# Patient Record
Sex: MALE | Race: BLACK OR AFRICAN AMERICAN | NOT HISPANIC OR LATINO | Employment: UNEMPLOYED | URBAN - METROPOLITAN AREA
[De-identification: names, ages, dates, MRNs, and addresses within clinical notes are randomized per-mention and may not be internally consistent; named-entity substitution may affect disease eponyms.]

---

## 2019-08-20 ENCOUNTER — HOSPITAL ENCOUNTER (OUTPATIENT)
Dept: MEDSURG UNIT | Facility: HOSPITAL | Age: 59
End: 2019-08-21
Attending: INTERNAL MEDICINE | Admitting: INTERNAL MEDICINE

## 2019-08-20 LAB
ABS NEUT (OLG): 3.41 X10(3)/MCL (ref 2.1–9.2)
ALBUMIN SERPL-MCNC: 3.5 GM/DL (ref 3.4–5)
ALBUMIN/GLOB SERPL: 0.9 RATIO (ref 1.1–2)
ALP SERPL-CCNC: 75 UNIT/L (ref 45–117)
ALT SERPL-CCNC: 42 UNIT/L (ref 12–78)
AMPHET UR QL SCN: NEGATIVE
APPEARANCE, UA: CLEAR
APTT PPP: 26.9 SECOND(S) (ref 23.3–37)
AST SERPL-CCNC: 28 UNIT/L (ref 15–37)
BACTERIA #/AREA URNS AUTO: ABNORMAL /[HPF]
BARBITURATE SCN PRESENT UR: NEGATIVE
BASOPHILS # BLD AUTO: 0.03 X10(3)/MCL
BASOPHILS NFR BLD AUTO: 0 %
BENZODIAZ UR QL SCN: NEGATIVE
BILIRUB SERPL-MCNC: 0.3 MG/DL (ref 0.2–1)
BILIRUB UR QL STRIP: NEGATIVE
BILIRUBIN DIRECT+TOT PNL SERPL-MCNC: 0.1 MG/DL
BILIRUBIN DIRECT+TOT PNL SERPL-MCNC: 0.2 MG/DL
BUN SERPL-MCNC: 27 MG/DL (ref 7–18)
CALCIUM SERPL-MCNC: 8.5 MG/DL (ref 8.5–10.1)
CANNABINOIDS UR QL SCN: NEGATIVE
CHLORIDE SERPL-SCNC: 98 MMOL/L (ref 98–107)
CK MB SERPL-MCNC: 2.1 NG/ML (ref 1–3.6)
CK SERPL-CCNC: 373 UNIT/L (ref 39–308)
CO2 SERPL-SCNC: 28 MMOL/L (ref 21–32)
COCAINE UR QL SCN: NEGATIVE
COLOR UR: NORMAL
CREAT SERPL-MCNC: 1.4 MG/DL (ref 0.6–1.3)
EOSINOPHIL # BLD AUTO: 0.14 X10(3)/MCL
EOSINOPHIL NFR BLD AUTO: 2 %
ERYTHROCYTE [DISTWIDTH] IN BLOOD BY AUTOMATED COUNT: 13.3 % (ref 11.5–14.5)
ETHANOL SERPL-MCNC: 102 MG/DL
GLOBULIN SER-MCNC: 3.7 GM/ML (ref 2.3–3.5)
GLUCOSE (UA): NORMAL
GLUCOSE SERPL-MCNC: 105 MG/DL (ref 74–106)
HCT VFR BLD AUTO: 49.2 % (ref 40–51)
HGB BLD-MCNC: 16 GM/DL (ref 13.5–17.5)
HGB UR QL STRIP: NEGATIVE
HYALINE CASTS #/AREA URNS LPF: ABNORMAL /[LPF]
IMM GRANULOCYTES # BLD AUTO: 0.01 10*3/UL
IMM GRANULOCYTES NFR BLD AUTO: 0 %
INR PPP: 1.02 (ref 0.9–1.2)
KETONES UR QL STRIP: NEGATIVE
LEUKOCYTE ESTERASE UR QL STRIP: NEGATIVE
LYMPHOCYTES # BLD AUTO: 1.73 X10(3)/MCL
LYMPHOCYTES NFR BLD AUTO: 29 % (ref 13–40)
MAGNESIUM SERPL-MCNC: 2.4 MG/DL (ref 1.6–2.6)
MCH RBC QN AUTO: 28 PG (ref 26–34)
MCHC RBC AUTO-ENTMCNC: 32.5 GM/DL (ref 31–37)
MCV RBC AUTO: 86 FL (ref 80–100)
MONOCYTES # BLD AUTO: 0.62 X10(3)/MCL
MONOCYTES NFR BLD AUTO: 10 % (ref 0–24)
NEUTROPHILS # BLD AUTO: 3.41 X10(3)/MCL
NEUTROPHILS NFR BLD AUTO: 57 X10(3)/MCL
NITRITE UR QL STRIP: NEGATIVE
OPIATES UR QL SCN: NEGATIVE
PCP UR QL: NEGATIVE
PH UR STRIP.AUTO: 5.5 [PH] (ref 5–8)
PH UR STRIP: 5.5 [PH] (ref 4.5–8)
PHOSPHATE SERPL-MCNC: 2.4 MG/DL (ref 2.5–4.9)
PLATELET # BLD AUTO: 192 X10(3)/MCL (ref 130–400)
PMV BLD AUTO: 9.1 FL (ref 7.4–10.4)
POTASSIUM SERPL-SCNC: 3.6 MMOL/L (ref 3.5–5.1)
PROT SERPL-MCNC: 7.2 GM/DL (ref 6.4–8.2)
PROT UR QL STRIP: NEGATIVE
PROTHROMBIN TIME: 13.3 SECOND(S) (ref 11.9–14.4)
RBC # BLD AUTO: 5.72 X10(6)/MCL (ref 4.5–5.9)
RBC #/AREA URNS AUTO: ABNORMAL /[HPF]
SODIUM SERPL-SCNC: 134 MMOL/L (ref 136–145)
SP GR UR STRIP: 1.01 (ref 1–1.03)
SQUAMOUS #/AREA URNS LPF: ABNORMAL /[LPF]
TEMPERATURE, URINE (OHS): 24.1 DEGC (ref 20–25)
TROPONIN I SERPL-MCNC: <0.015 NG/ML (ref 0–0.05)
UROBILINOGEN UR STRIP-ACNC: NORMAL
WBC # SPEC AUTO: 5.9 X10(3)/MCL (ref 4.5–11)
WBC #/AREA URNS AUTO: ABNORMAL /HPF

## 2019-08-21 LAB
ABS NEUT (OLG): 4.13 X10(3)/MCL (ref 2.1–9.2)
ALBUMIN SERPL-MCNC: 3.4 GM/DL (ref 3.4–5)
ALBUMIN/GLOB SERPL: 0.9 RATIO (ref 1.1–2)
ALP SERPL-CCNC: 76 UNIT/L (ref 45–117)
ALT SERPL-CCNC: 45 UNIT/L (ref 12–78)
AST SERPL-CCNC: 35 UNIT/L (ref 15–37)
BASOPHILS # BLD AUTO: 0.04 X10(3)/MCL
BASOPHILS NFR BLD AUTO: 1 %
BILIRUB SERPL-MCNC: 0.4 MG/DL (ref 0.2–1)
BILIRUBIN DIRECT+TOT PNL SERPL-MCNC: 0.1 MG/DL
BILIRUBIN DIRECT+TOT PNL SERPL-MCNC: 0.3 MG/DL
BUN SERPL-MCNC: 21 MG/DL (ref 7–18)
CALCIUM SERPL-MCNC: 8.8 MG/DL (ref 8.5–10.1)
CHLORIDE SERPL-SCNC: 102 MMOL/L (ref 98–107)
CHOLEST SERPL-MCNC: 164 MG/DL
CHOLEST/HDLC SERPL: 3.9 {RATIO} (ref 0–5)
CO2 SERPL-SCNC: 29 MMOL/L (ref 21–32)
CREAT SERPL-MCNC: 1 MG/DL (ref 0.6–1.3)
EOSINOPHIL # BLD AUTO: 0.28 X10(3)/MCL
EOSINOPHIL NFR BLD AUTO: 4 %
ERYTHROCYTE [DISTWIDTH] IN BLOOD BY AUTOMATED COUNT: 13.3 % (ref 11.5–14.5)
EST. AVERAGE GLUCOSE BLD GHB EST-MCNC: 123 MG/DL
FOLATE SERPL-MCNC: 6.5 NG/ML (ref 3.1–17.5)
GLOBULIN SER-MCNC: 3.9 GM/ML (ref 2.3–3.5)
GLUCOSE SERPL-MCNC: 96 MG/DL (ref 74–106)
HAV IGM SERPL QL IA: NONREACTIVE
HBA1C MFR BLD: 5.9 % (ref 4.2–6.3)
HBV CORE IGM SERPL QL IA: NONREACTIVE
HBV SURFACE AG SERPL QL IA: NEGATIVE
HCT VFR BLD AUTO: 52.4 % (ref 40–51)
HCV AB SERPL QL IA: NONREACTIVE
HDLC SERPL-MCNC: 42 MG/DL
HGB BLD-MCNC: 16.7 GM/DL (ref 13.5–17.5)
HIV 1+2 AB+HIV1 P24 AG SERPL QL IA: NONREACTIVE
IMM GRANULOCYTES # BLD AUTO: 0.03 10*3/UL
IMM GRANULOCYTES NFR BLD AUTO: 0 %
LDLC SERPL CALC-MCNC: 94 MG/DL (ref 0–130)
LYMPHOCYTES # BLD AUTO: 1.81 X10(3)/MCL
LYMPHOCYTES NFR BLD AUTO: 26 % (ref 13–40)
MCH RBC QN AUTO: 27.8 PG (ref 26–34)
MCHC RBC AUTO-ENTMCNC: 31.9 GM/DL (ref 31–37)
MCV RBC AUTO: 87.3 FL (ref 80–100)
MONOCYTES # BLD AUTO: 0.81 X10(3)/MCL
MONOCYTES NFR BLD AUTO: 11 % (ref 0–24)
NEUTROPHILS # BLD AUTO: 4.13 X10(3)/MCL
NEUTROPHILS NFR BLD AUTO: 58 X10(3)/MCL
PLATELET # BLD AUTO: 199 X10(3)/MCL (ref 130–400)
PMV BLD AUTO: 9.6 FL (ref 7.4–10.4)
POTASSIUM SERPL-SCNC: 4 MMOL/L (ref 3.5–5.1)
PROT SERPL-MCNC: 7.3 GM/DL (ref 6.4–8.2)
RBC # BLD AUTO: 6 X10(6)/MCL (ref 4.5–5.9)
SODIUM SERPL-SCNC: 137 MMOL/L (ref 136–145)
T PALLIDUM AB SER QL: NONREACTIVE
TRIGL SERPL-MCNC: 139 MG/DL
TROPONIN I SERPL-MCNC: <0.015 NG/ML (ref 0–0.05)
TSH SERPL-ACNC: 0.45 MIU/L (ref 0.36–3.74)
VIT B12 SERPL-MCNC: 341 PG/ML (ref 193–986)
VLDLC SERPL CALC-MCNC: 28 MG/DL
WBC # SPEC AUTO: 7.1 X10(3)/MCL (ref 4.5–11)

## 2019-12-12 ENCOUNTER — HISTORICAL (OUTPATIENT)
Dept: RADIOLOGY | Facility: HOSPITAL | Age: 59
End: 2019-12-12

## 2020-10-12 ENCOUNTER — HOSPITAL ENCOUNTER (OUTPATIENT)
Dept: MEDSURG UNIT | Facility: HOSPITAL | Age: 60
End: 2020-10-13
Attending: OTOLARYNGOLOGY | Admitting: OTOLARYNGOLOGY

## 2022-04-10 ENCOUNTER — HISTORICAL (OUTPATIENT)
Dept: ADMINISTRATIVE | Facility: HOSPITAL | Age: 62
End: 2022-04-10

## 2022-04-25 VITALS
HEIGHT: 72 IN | DIASTOLIC BLOOD PRESSURE: 71 MMHG | SYSTOLIC BLOOD PRESSURE: 137 MMHG | BODY MASS INDEX: 25.38 KG/M2 | WEIGHT: 187.38 LBS

## 2022-04-30 NOTE — DISCHARGE SUMMARY
Patient:   Anton Avelar            MRN: [id number]            FIN: [id number]               Age:   60 years     Sex:  Male     :  1960   Associated Diagnoses:   None   Author:   Vivienne Sifuentes MD       OTOLARYNGOLOGY DISCHARGE SUMMARY     Admission Date: 10/12/2020   Discharge Date:    10/13/2020  Admitting Attending Physician: Adair Wheatley MD  Discharging Attending Physician: Adair Wheatley MD      Consulting Physician (s): None  Condition on discharge: stable     Final diagnosis: Left zygomatic tripod fracture with fracture of left orbital floor  Procedures: Multiple approach to open reduction and internal fixation of left zygomatic tripod fracture and left orbital floor fracture, please see operative note for operative details  HPI/ Hospital course: Patient presented to ENT clinic after a traumatic fall resulting in left zygomatic tripod fracture and an associated left orbital floor fracture resulting in hypoglobus.  Discussed, benefits indications and alternatives were discussed with patient he underwent a open reduction viaa  left-sided subciliary incision, left-sided maxillary gingivobuccal incision with reduction and 3-point stabilization of his left zygomatic tripod fracture.  Additionally, patient also underwent placement of an alloplastic implant for reconstruction of his left orbital floor.  Postoperatively patient was admitted for close observation of his eye.  Overnight patient pain was controlled and he had minimal edema over the left eye.  His extraocular movements remained intact and he was appropriate for discharge the morning of 10/13/2020.      Discharge Medications:  Prescribed  acetaminophen (acetaminophen 500 mg oral tablet) 1,000 mg, Oral, [id number], PRN fever  amoxicillin-clavulanate (amoxicillin-clavulanate 875 mg-125 mg oral tablet) 875 mg, Oral, q12hr  bacitracin ophthalmic (bacitracin 500 units/g ophthalmic ointment) 1 tay, OPTH, TID  celecoxib (CeleBREX 200 mg oral  capsule) 200 mg, Oral, BID  dexamethasone-tobramycin ophthalmic (Tobradex 0.3%-0.1% ophthalmic suspension) 1 drop(s), Eye-Left, QID  traMADol (traMADol 50 mg oral tablet) 50 mg, Oral, q6hr, PRN pain  Continue  FLUoxetine (fluoxetine 20 mg oral capsule) 20 mg, Oral, qAM  albuterol (albuterol CFC free 90 mcg/inh inhalation aerosol with adapter) 2 puff(s), INH, QID  alfuzosin (alfuzosin 10 mg oral tablet, extended release) 10 mg, Oral, Daily  atorvastatin (atorvastatin 20 mg oral tablet) 20 mg, Oral, Daily  hydrochlorothiazide-lisinopril (hydrochlorothiazide-lisinopril 12.5 mg-20 mg oral tablet) 2 tab(s), Oral, Daily  lisinopril (lisinopril 20 mg oral tablet) 20 mg, Oral, Daily  tamsulosin (Flomax 0.4 mg oral capsule) 0.4 mg, Oral, Daily  traZODone (traZODONE 50 mg oral tablet ( Desyrel )) 50 mg, Oral, qPM       Discharge Instructions:  No heavy lifting for 4 weeks  No contact sports  Regular renal/ cardiac diet      Wound care:  Please keep your wound clean and dry. It is okay to shower with running warm and soapy water however, do no soak. Avoid baths, rivers, ponds.     Follow Up: 7 to 10 days     Vivienne Sifuentes MD  [employer] Dept of Otolaryngology  [facility]
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
## 2022-10-07 ENCOUNTER — HOSPITAL ENCOUNTER (EMERGENCY)
Facility: HOSPITAL | Age: 62
Discharge: HOME | End: 2022-10-07
Attending: FAMILY MEDICINE
Payer: MEDICAID

## 2022-10-07 VITALS
OXYGEN SATURATION: 99 % | BODY MASS INDEX: 25.31 KG/M2 | HEART RATE: 88 BPM | SYSTOLIC BLOOD PRESSURE: 176 MMHG | WEIGHT: 180.75 LBS | DIASTOLIC BLOOD PRESSURE: 80 MMHG | HEIGHT: 71 IN | RESPIRATION RATE: 16 BRPM | TEMPERATURE: 98 F

## 2022-10-07 DIAGNOSIS — N43.3 RIGHT HYDROCELE: Primary | ICD-10-CM

## 2022-10-07 PROCEDURE — 99284 EMERGENCY DEPT VISIT MOD MDM: CPT | Mod: 25

## 2022-10-07 RX ORDER — TAMSULOSIN HYDROCHLORIDE 0.4 MG/1
0.4 CAPSULE ORAL DAILY
Qty: 30 CAPSULE | Refills: 0 | Status: SHIPPED | OUTPATIENT
Start: 2022-10-07 | End: 2023-01-18

## 2022-10-07 NOTE — ED PROVIDER NOTES
Encounter Date: 10/7/2022       History     Chief Complaint   Patient presents with    Groin Swelling     Pt reports erectile dysfunction secondary to testicular swelling x 3 days.      62-year-old gentleman presents emergency room with complaints of right groin swelling this been ongoing for the last few months.  Patient reports swelling is worse with straining in walking, better at night when lying down.  Denies dysuria or hematuria.  Patient does report increased waking at night and urinating small amounts.  Patient denies urethral discharge.  Denies adverse fever chills.  Denies nausea or vomiting.  Denies abdominal pain.  Reports a good appetite.    The history is provided by the patient.   Review of patient's allergies indicates:  No Known Allergies  History reviewed. No pertinent past medical history.  History reviewed. No pertinent surgical history.  History reviewed. No pertinent family history.  Social History     Tobacco Use    Smoking status: Never    Smokeless tobacco: Never     Review of Systems   Constitutional:  Negative for chills, fatigue and fever.   HENT:  Negative for ear pain, rhinorrhea and sore throat.    Eyes:  Negative for photophobia and pain.   Respiratory:  Negative for cough, shortness of breath and wheezing.    Cardiovascular:  Negative for chest pain.   Gastrointestinal:  Negative for abdominal pain, diarrhea, nausea and vomiting.   Genitourinary:  Negative for dysuria.   Neurological:  Negative for dizziness, weakness and headaches.   All other systems reviewed and are negative.    Physical Exam     Initial Vitals [10/07/22 0845]   BP Pulse Resp Temp SpO2   (!) 176/80 88 16 98.2 °F (36.8 °C) 99 %      MAP       --         Physical Exam    Nursing note and vitals reviewed.  Constitutional: He appears well-developed and well-nourished.   HENT:   Head: Normocephalic and atraumatic.   Eyes: EOM are normal. Pupils are equal, round, and reactive to light.   Neck: Neck supple.   Normal  range of motion.  Cardiovascular:  Normal rate, regular rhythm and normal heart sounds.     Exam reveals no gallop and no friction rub.       No murmur heard.  Pulmonary/Chest: Breath sounds normal. No respiratory distress.   Abdominal: Abdomen is soft. Bowel sounds are normal. He exhibits no distension. There is no abdominal tenderness.   Genitourinary:    Genitourinary Comments: Normal testicles bilaterally.  Large right indirect inguinal hernia.  No erythema or induration of the hernia; no tenderness to palpation.     Musculoskeletal:         General: Normal range of motion.      Cervical back: Normal range of motion and neck supple.     Neurological: He is alert and oriented to person, place, and time. He has normal strength.   Skin: Skin is warm and dry.   Psychiatric: He has a normal mood and affect. His behavior is normal. Judgment and thought content normal.       ED Course   Procedures  Labs Reviewed - No data to display       Imaging Results              CT Abdomen Pelvis  Without Contrast (Final result)  Result time 10/07/22 10:01:49      Final result by Leora Coto MD (10/07/22 10:01:49)                   Impression:      Findings consistent with right-sided hydrocele in the scrotal sac with but no inguinal hernia is seen    Otherwise unremarkable      Electronically signed by: Leora Coto  Date:    10/07/2022  Time:    10:01               Narrative:    EXAMINATION:  CT ABDOMEN PELVIS WITHOUT CONTRAST    CLINICAL HISTORY:  Right inguinal hernia;    TECHNIQUE:  Low dose axial images, sagittal and coronal reformations were obtained from the lung bases to the pubic symphysis.  No contrast was administered.  Automatic exposure control is utilized to reduce patient radiation exposure.    COMPARISON:  None    FINDINGS:  The lung bases are clear.    There is a soft tissue lipoma along the lateral thoracic wall on the right side    The liver appears normal.  No obvious liver mass or lesion is  seen.    Gallbladder appears normal.  No gallstones are seen.    The pancreas appears normal.  No inflammatory changes are seen in the pancreatic region.    The spleen appears normal and adrenal glands appear normal.  No adrenal nodule is seen.    No nephrolithiasis is seen.  No hydronephrosis is seen.  No hydroureter is seen.  No ureteral stone is seen.    No colitis is seen.  No diverticulitis is seen.  No appendicitis is seen.    No free air seen.  No free fluid is seen.  The urinary bladder appears normal.    There appears to be hydrocele in the scrotal sac on the right side.  No inguinal hernia seen.    Bones show no acute abnormality.                                       Medications - No data to display  Medical Decision Making:   Initial Assessment:   Patient placed in trendelenburg position and gentle pressure applied to inguinal hernia.  No pain, and patient tolerated exam well, but unable to reduce at this time.  Will obtain a CT scan for evaluation of the hernia, and will place ice pack over the affected area.           ED Course as of 10/07/22 1012   Fri Oct 07, 2022   1008 On CT scan, area consistent with a right hydrocele.  Will refer to Urology for follow-up.  Stable for discharge to home.  ER precautions given for any acute worsening. [MW]      ED Course User Index  [MW] Ham Clay MD                 Clinical Impression:   Final diagnoses:  [N43.3] Right hydrocele (Primary)      ED Disposition Condition    Discharge Stable          ED Prescriptions       Medication Sig Dispense Start Date End Date Auth. Provider    tamsulosin (FLOMAX) 0.4 mg Cap Take 1 capsule (0.4 mg total) by mouth once daily. 30 capsule 10/7/2022 11/6/2022 Ham Clay MD          Follow-up Information       Follow up With Specialties Details Why Contact Info    [facility] Emergency Medicine  As needed, If symptoms worsen [address] [facility] [address] [phone]     [facility] Urology   [address] [facility] [address]  [phone]             Ham Clay MD  10/07/22 1012

## 2023-01-18 ENCOUNTER — OFFICE VISIT (OUTPATIENT)
Dept: UROLOGY | Facility: CLINIC | Age: 63
End: 2023-01-18
Payer: MEDICAID

## 2023-01-18 VITALS
RESPIRATION RATE: 19 BRPM | HEIGHT: 70 IN | WEIGHT: 191 LBS | TEMPERATURE: 98 F | DIASTOLIC BLOOD PRESSURE: 81 MMHG | SYSTOLIC BLOOD PRESSURE: 135 MMHG | HEART RATE: 67 BPM | OXYGEN SATURATION: 95 % | BODY MASS INDEX: 27.35 KG/M2

## 2023-01-18 DIAGNOSIS — N43.3 RIGHT HYDROCELE: ICD-10-CM

## 2023-01-18 DIAGNOSIS — N13.8 BPH WITH OBSTRUCTION/LOWER URINARY TRACT SYMPTOMS: Primary | ICD-10-CM

## 2023-01-18 DIAGNOSIS — N40.1 BPH WITH OBSTRUCTION/LOWER URINARY TRACT SYMPTOMS: Primary | ICD-10-CM

## 2023-01-18 PROCEDURE — 3079F PR MOST RECENT DIASTOLIC BLOOD PRESSURE 80-89 MM HG: ICD-10-PCS | Mod: CPTII,,, | Performed by: UROLOGY

## 2023-01-18 PROCEDURE — 1160F PR REVIEW ALL MEDS BY PRESCRIBER/CLIN PHARMACIST DOCUMENTED: ICD-10-PCS | Mod: CPTII,,, | Performed by: UROLOGY

## 2023-01-18 PROCEDURE — 1159F MED LIST DOCD IN RCRD: CPT | Mod: CPTII,,, | Performed by: UROLOGY

## 2023-01-18 PROCEDURE — 3008F PR BODY MASS INDEX (BMI) DOCUMENTED: ICD-10-PCS | Mod: CPTII,,, | Performed by: UROLOGY

## 2023-01-18 PROCEDURE — 3079F DIAST BP 80-89 MM HG: CPT | Mod: CPTII,,, | Performed by: UROLOGY

## 2023-01-18 PROCEDURE — 3075F PR MOST RECENT SYSTOLIC BLOOD PRESS GE 130-139MM HG: ICD-10-PCS | Mod: CPTII,,, | Performed by: UROLOGY

## 2023-01-18 PROCEDURE — 99215 OFFICE O/P EST HI 40 MIN: CPT | Mod: PBBFAC | Performed by: UROLOGY

## 2023-01-18 PROCEDURE — 99204 PR OFFICE/OUTPT VISIT, NEW, LEVL IV, 45-59 MIN: ICD-10-PCS | Mod: S$PBB,,, | Performed by: UROLOGY

## 2023-01-18 PROCEDURE — 3008F BODY MASS INDEX DOCD: CPT | Mod: CPTII,,, | Performed by: UROLOGY

## 2023-01-18 PROCEDURE — 1160F RVW MEDS BY RX/DR IN RCRD: CPT | Mod: CPTII,,, | Performed by: UROLOGY

## 2023-01-18 PROCEDURE — 1159F PR MEDICATION LIST DOCUMENTED IN MEDICAL RECORD: ICD-10-PCS | Mod: CPTII,,, | Performed by: UROLOGY

## 2023-01-18 PROCEDURE — 3075F SYST BP GE 130 - 139MM HG: CPT | Mod: CPTII,,, | Performed by: UROLOGY

## 2023-01-18 PROCEDURE — 99204 OFFICE O/P NEW MOD 45 MIN: CPT | Mod: S$PBB,,, | Performed by: UROLOGY

## 2023-01-18 RX ORDER — FLUOXETINE HYDROCHLORIDE 20 MG/1
20 CAPSULE ORAL EVERY MORNING
COMMUNITY
Start: 2023-01-04

## 2023-01-18 RX ORDER — TIZANIDINE 2 MG/1
2 TABLET ORAL 3 TIMES DAILY PRN
COMMUNITY
Start: 2022-10-25

## 2023-01-18 RX ORDER — CETIRIZINE HYDROCHLORIDE 10 MG/1
10 TABLET ORAL DAILY PRN
COMMUNITY
Start: 2023-01-04

## 2023-01-18 RX ORDER — ALBUTEROL SULFATE 90 UG/1
1 AEROSOL, METERED RESPIRATORY (INHALATION) EVERY 4 HOURS PRN
COMMUNITY
Start: 2023-01-04

## 2023-01-18 RX ORDER — TAMSULOSIN HYDROCHLORIDE 0.4 MG/1
0.4 CAPSULE ORAL DAILY
Qty: 90 CAPSULE | Refills: 3 | Status: SHIPPED | OUTPATIENT
Start: 2023-01-18 | End: 2024-04-03

## 2023-01-18 RX ORDER — ATORVASTATIN CALCIUM 20 MG/1
20 TABLET, FILM COATED ORAL DAILY
COMMUNITY
Start: 2022-11-11

## 2023-01-18 RX ORDER — TRAZODONE HYDROCHLORIDE 50 MG/1
50 TABLET ORAL NIGHTLY PRN
COMMUNITY
Start: 2023-01-04

## 2023-01-18 RX ORDER — BUDESONIDE AND FORMOTEROL FUMARATE DIHYDRATE 160; 4.5 UG/1; UG/1
2 AEROSOL RESPIRATORY (INHALATION) 2 TIMES DAILY
COMMUNITY
Start: 2023-01-04

## 2023-01-18 RX ORDER — LISINOPRIL AND HYDROCHLOROTHIAZIDE 12.5; 2 MG/1; MG/1
1 TABLET ORAL
COMMUNITY
Start: 2022-11-15

## 2023-01-19 NOTE — PROGRESS NOTES
CC:  Hydrocele    HPI:  Anton Avelar is a 62 y.o. male being seen in consultation for hydrocele.  The patient began having right groin pain and was seen in the emergency room on 7 October 2022.  He had a CT scan done at that time which showed what appears to be a hydrocele on the right.  He was having nocturia every two hours so the emergency room placed him on tamsulosin.  That has been working well and he is nocturia is now down to one time a night.  He requests a refill of this.    Imaging:  See HPI.      Data Review:  Note from referring provider, Tino Clay MD dated 7 October 2022.  CT scan.    ROS:  All systems reviewed and are negative except as documented in HPI and/or Assessment and Plan.     Patient Active Problem List:     There is no problem list on file for this patient.       Past Medical History:  History reviewed. No pertinent past medical history.     Past Surgical History:  History reviewed. No pertinent surgical history.     Family History:  History reviewed. No pertinent family history.     Social History:  Social History     Socioeconomic History    Marital status: Single   Tobacco Use    Smoking status: Every Day     Packs/day: 0.50     Types: Cigarettes    Smokeless tobacco: Never        Allergies:  Review of patient's allergies indicates:  No Known Allergies     Objective:  Vitals:    01/18/23 1032   BP: 135/81   Pulse: 67   Resp: 19   Temp: 98.2 °F (36.8 °C)     General:  Well developed, well nourished adult male in no acute distress  Abdomen: Soft, nontender, no masses  Extremities:  No clubbing, cyanosis, or edema  Neurologic:  Grossly intact  Musculoskeletal:  Normal tone  Penis:  Circumcised, no lesions  Scrotum:  There is enlargement of the right hemiscrotum consistent with a right hydrocele.  Testicles:  Nontender, no masses  Epididymis:  Normal without masses  Prostate exam:  Nontender, symmetrical, no nodules  Rectal:  Normal rectal tone    Assessment:  1. Right  hydrocele  - Ambulatory referral/consult to Urology  - US Scrotum And Testicles; Future    2. BPH with obstruction/lower urinary tract symptoms  - tamsulosin (FLOMAX) 0.4 mg Cap; Take 1 capsule (0.4 mg total) by mouth once daily.  Dispense: 90 capsule; Refill: 3     Plan:  Scrotal ultrasound to evaluate the testicle and the probable right hydrocele.  Continue tamsulosin.  Refill given today.    Follow-up:  After scrotal ultrasound

## 2023-01-25 ENCOUNTER — HOSPITAL ENCOUNTER (OUTPATIENT)
Dept: RADIOLOGY | Facility: HOSPITAL | Age: 63
Discharge: HOME | End: 2023-01-25
Attending: UROLOGY
Payer: MEDICAID

## 2023-01-25 DIAGNOSIS — N43.3 RIGHT HYDROCELE: ICD-10-CM

## 2023-01-25 PROCEDURE — 76870 US EXAM SCROTUM: CPT | Mod: TC

## 2023-02-20 ENCOUNTER — TELEPHONE (OUTPATIENT)
Dept: UROLOGY | Facility: CLINIC | Age: 63
End: 2023-02-20
Payer: MEDICAID

## 2023-02-20 NOTE — TELEPHONE ENCOUNTER
Patient called to confirm appointment for Urology on 02/22/2023. Patient confirmed appointment.

## 2023-02-22 ENCOUNTER — OFFICE VISIT (OUTPATIENT)
Dept: UROLOGY | Facility: CLINIC | Age: 63
End: 2023-02-22
Payer: MEDICAID

## 2023-02-22 VITALS
HEART RATE: 79 BPM | BODY MASS INDEX: 27.72 KG/M2 | SYSTOLIC BLOOD PRESSURE: 155 MMHG | WEIGHT: 193.63 LBS | RESPIRATION RATE: 18 BRPM | DIASTOLIC BLOOD PRESSURE: 87 MMHG | TEMPERATURE: 98 F | OXYGEN SATURATION: 95 % | HEIGHT: 70 IN

## 2023-02-22 DIAGNOSIS — N40.1 BPH WITH OBSTRUCTION/LOWER URINARY TRACT SYMPTOMS: ICD-10-CM

## 2023-02-22 DIAGNOSIS — N50.3 EPIDIDYMAL CYST: Primary | ICD-10-CM

## 2023-02-22 DIAGNOSIS — N13.8 BPH WITH OBSTRUCTION/LOWER URINARY TRACT SYMPTOMS: ICD-10-CM

## 2023-02-22 PROCEDURE — 3077F SYST BP >= 140 MM HG: CPT | Mod: CPTII,,, | Performed by: UROLOGY

## 2023-02-22 PROCEDURE — 1159F MED LIST DOCD IN RCRD: CPT | Mod: CPTII,,, | Performed by: UROLOGY

## 2023-02-22 PROCEDURE — 1160F RVW MEDS BY RX/DR IN RCRD: CPT | Mod: CPTII,,, | Performed by: UROLOGY

## 2023-02-22 PROCEDURE — 1159F PR MEDICATION LIST DOCUMENTED IN MEDICAL RECORD: ICD-10-PCS | Mod: CPTII,,, | Performed by: UROLOGY

## 2023-02-22 PROCEDURE — 3079F DIAST BP 80-89 MM HG: CPT | Mod: CPTII,,, | Performed by: UROLOGY

## 2023-02-22 PROCEDURE — 4010F PR ACE/ARB THEARPY RXD/TAKEN: ICD-10-PCS | Mod: CPTII,,, | Performed by: UROLOGY

## 2023-02-22 PROCEDURE — 3077F PR MOST RECENT SYSTOLIC BLOOD PRESSURE >= 140 MM HG: ICD-10-PCS | Mod: CPTII,,, | Performed by: UROLOGY

## 2023-02-22 PROCEDURE — 4010F ACE/ARB THERAPY RXD/TAKEN: CPT | Mod: CPTII,,, | Performed by: UROLOGY

## 2023-02-22 PROCEDURE — 99213 PR OFFICE/OUTPT VISIT, EST, LEVL III, 20-29 MIN: ICD-10-PCS | Mod: S$PBB,,, | Performed by: UROLOGY

## 2023-02-22 PROCEDURE — 3008F PR BODY MASS INDEX (BMI) DOCUMENTED: ICD-10-PCS | Mod: CPTII,,, | Performed by: UROLOGY

## 2023-02-22 PROCEDURE — 3079F PR MOST RECENT DIASTOLIC BLOOD PRESSURE 80-89 MM HG: ICD-10-PCS | Mod: CPTII,,, | Performed by: UROLOGY

## 2023-02-22 PROCEDURE — 99213 OFFICE O/P EST LOW 20 MIN: CPT | Mod: S$PBB,,, | Performed by: UROLOGY

## 2023-02-22 PROCEDURE — 3008F BODY MASS INDEX DOCD: CPT | Mod: CPTII,,, | Performed by: UROLOGY

## 2023-02-22 PROCEDURE — 99214 OFFICE O/P EST MOD 30 MIN: CPT | Mod: PBBFAC | Performed by: UROLOGY

## 2023-02-22 PROCEDURE — 1160F PR REVIEW ALL MEDS BY PRESCRIBER/CLIN PHARMACIST DOCUMENTED: ICD-10-PCS | Mod: CPTII,,, | Performed by: UROLOGY

## 2023-02-22 RX ORDER — ASPIRIN 81 MG/1
81 TABLET ORAL DAILY
COMMUNITY

## 2023-02-23 NOTE — PROGRESS NOTES
CC:  Imaging results    HPI:  Anton Avelar is a 62 y.o. male seen for follow-up of scrotal ultrasound.  He had a CT scan which showed fluid in the right hemiscrotum.  An ultrasound was ordered and he is here today for follow-up of that.  He is on tamsulosin for urinary complaints and is doing well with that.    Imaging:  Scrotal ultrasound - 25 January 2023:  There is a spermatocele which measures 6.6 x 7.1 x 2.8 cm on the right.      ROS:  All systems reviewed and are negative except as documented in HPI and/or Assessment and Plan.     Patient Active Problem List:     There is no problem list on file for this patient.       Past Medical History:  History reviewed. No pertinent past medical history.     Past Surgical History:  History reviewed. No pertinent surgical history.     Family History:  History reviewed. No pertinent family history.     Social History:  Social History     Socioeconomic History    Marital status: Single   Tobacco Use    Smoking status: Every Day     Packs/day: 0.50     Types: Cigarettes    Smokeless tobacco: Never        Allergies:  Review of patient's allergies indicates:  No Known Allergies     Objective:  Vitals:    02/22/23 1507   BP: (!) 155/87   Pulse: 79   Resp: 18   Temp: 97.5 °F (36.4 °C)     General:  Well developed, well nourished adult male in no acute distress  Abdomen: Soft, nontender, no masses  Extremities:  No clubbing, cyanosis, or edema  Neurologic:  Grossly intact  Musculoskeletal:  Normal tone      Assessment:  1. Epididymal cyst    2. BPH with obstruction/lower urinary tract symptoms  - PSA, Total (Diagnostic); Future     Plan:  I discussed the epididymal cyst with the patient.  The options including observation versus surgery were discussed with him.  This is really not bothering him and he does not want to have anything done about this at the current time.  Continue tamsulosin.    Follow-up:  Six months after PSA.

## 2023-08-23 ENCOUNTER — OFFICE VISIT (OUTPATIENT)
Dept: UROLOGY | Facility: CLINIC | Age: 63
End: 2023-08-23
Payer: MEDICAID

## 2023-08-23 VITALS
RESPIRATION RATE: 18 BRPM | OXYGEN SATURATION: 95 % | BODY MASS INDEX: 24.77 KG/M2 | SYSTOLIC BLOOD PRESSURE: 151 MMHG | TEMPERATURE: 99 F | WEIGHT: 193 LBS | HEART RATE: 80 BPM | DIASTOLIC BLOOD PRESSURE: 75 MMHG | HEIGHT: 74 IN

## 2023-08-23 DIAGNOSIS — N40.1 BPH WITH OBSTRUCTION/LOWER URINARY TRACT SYMPTOMS: Primary | ICD-10-CM

## 2023-08-23 DIAGNOSIS — N13.8 BPH WITH OBSTRUCTION/LOWER URINARY TRACT SYMPTOMS: Primary | ICD-10-CM

## 2023-08-23 DIAGNOSIS — N50.3 EPIDIDYMAL CYST: ICD-10-CM

## 2023-08-23 LAB
BILIRUB SERPL-MCNC: NORMAL MG/DL
BLOOD URINE, POC: NORMAL
COLOR, POC UA: YELLOW
GLUCOSE UR QL STRIP: NORMAL
KETONES UR QL STRIP: NORMAL
LEUKOCYTE ESTERASE URINE, POC: NORMAL
NITRITE, POC UA: NORMAL
PH, POC UA: 5.5
PROTEIN, POC: NORMAL
SPECIFIC GRAVITY, POC UA: >=1.03
UROBILINOGEN, POC UA: 0.2

## 2023-08-23 PROCEDURE — 81001 URINALYSIS AUTO W/SCOPE: CPT | Mod: PBBFAC | Performed by: UROLOGY

## 2023-08-23 PROCEDURE — 99213 OFFICE O/P EST LOW 20 MIN: CPT | Mod: S$PBB,,, | Performed by: UROLOGY

## 2023-08-23 PROCEDURE — 1160F RVW MEDS BY RX/DR IN RCRD: CPT | Mod: CPTII,,, | Performed by: UROLOGY

## 2023-08-23 PROCEDURE — 3078F PR MOST RECENT DIASTOLIC BLOOD PRESSURE < 80 MM HG: ICD-10-PCS | Mod: CPTII,,, | Performed by: UROLOGY

## 2023-08-23 PROCEDURE — 1159F MED LIST DOCD IN RCRD: CPT | Mod: CPTII,,, | Performed by: UROLOGY

## 2023-08-23 PROCEDURE — 3078F DIAST BP <80 MM HG: CPT | Mod: CPTII,,, | Performed by: UROLOGY

## 2023-08-23 PROCEDURE — 99215 OFFICE O/P EST HI 40 MIN: CPT | Mod: PBBFAC | Performed by: UROLOGY

## 2023-08-23 PROCEDURE — 3077F SYST BP >= 140 MM HG: CPT | Mod: CPTII,,, | Performed by: UROLOGY

## 2023-08-23 PROCEDURE — 4010F ACE/ARB THERAPY RXD/TAKEN: CPT | Mod: CPTII,,, | Performed by: UROLOGY

## 2023-08-23 PROCEDURE — 3008F BODY MASS INDEX DOCD: CPT | Mod: CPTII,,, | Performed by: UROLOGY

## 2023-08-23 PROCEDURE — 1159F PR MEDICATION LIST DOCUMENTED IN MEDICAL RECORD: ICD-10-PCS | Mod: CPTII,,, | Performed by: UROLOGY

## 2023-08-23 PROCEDURE — 4010F PR ACE/ARB THEARPY RXD/TAKEN: ICD-10-PCS | Mod: CPTII,,, | Performed by: UROLOGY

## 2023-08-23 PROCEDURE — 1160F PR REVIEW ALL MEDS BY PRESCRIBER/CLIN PHARMACIST DOCUMENTED: ICD-10-PCS | Mod: CPTII,,, | Performed by: UROLOGY

## 2023-08-23 PROCEDURE — 3077F PR MOST RECENT SYSTOLIC BLOOD PRESSURE >= 140 MM HG: ICD-10-PCS | Mod: CPTII,,, | Performed by: UROLOGY

## 2023-08-23 PROCEDURE — 99213 PR OFFICE/OUTPT VISIT, EST, LEVL III, 20-29 MIN: ICD-10-PCS | Mod: S$PBB,,, | Performed by: UROLOGY

## 2023-08-23 PROCEDURE — 3008F PR BODY MASS INDEX (BMI) DOCUMENTED: ICD-10-PCS | Mod: CPTII,,, | Performed by: UROLOGY

## 2023-08-23 RX ORDER — DICLOFENAC SODIUM 50 MG/1
50 TABLET, DELAYED RELEASE ORAL 2 TIMES DAILY
COMMUNITY

## 2023-08-23 NOTE — PROGRESS NOTES
CC:  Follow-up    HPI:  Anton Avelar is a 63 y.o. male seen for follow-up.  He had a CT scan which showed fluid in the right hemiscrotum.  An ultrasound showed this to be an epididymal cyst.  This is not symptomatic for him.  He is on tamsulosin for urinary complaints and is doing well with that.    Urinalysis:  Results for orders placed or performed in visit on 08/23/23   POCT URINE DIPSTICK WITH MICROSCOPE, AUTOMATED   Result Value Ref Range    Color, UA Yellow     Spec Grav UA >=1.030     pH, UA 5.5     WBC, UA neg     Nitrite, UA neg     Protein, POC neg     Glucose, UA neg     Ketones, UA neg     Urobilinogen, UA 0.2     Bilirubin, POC neg     Blood, UA neg      Microscopic:   Negative     Lab Results:  PSA History:     Latest Reference Range & Units 07/09/18 09:51   PSA, Screen <<=4.0 ng/mL 0.2         ROS:  All systems reviewed and are negative except as documented in HPI and/or Assessment and Plan.     Patient Active Problem List:     There is no problem list on file for this patient.       Past Medical History:  Past Medical History:   Diagnosis Date    High cholesterol     Hypertension         Past Surgical History:  History reviewed. No pertinent surgical history.     Family History:  History reviewed. No pertinent family history.     Social History:  Social History     Socioeconomic History    Marital status: Single   Tobacco Use    Smoking status: Every Day     Current packs/day: 0.50     Types: Cigarettes    Smokeless tobacco: Never   Substance and Sexual Activity    Alcohol use: Yes     Alcohol/week: 6.0 standard drinks of alcohol     Types: 6 Cans of beer per week    Sexual activity: Yes     Partners: Male        Allergies:  Review of patient's allergies indicates:  No Known Allergies     Objective:  Vitals:    08/23/23 1501   BP: (!) 151/75   Pulse: 80   Resp: 18   Temp: 98.7 °F (37.1 °C)     General:  Well developed, well nourished adult male in no acute distress  Abdomen: Soft, nontender, no  masses  Extremities:  No clubbing, cyanosis, or edema  Neurologic:  Grossly intact  Musculoskeletal:  Normal tone    Last ROMAN:  January 2023    Assessment:  1. BPH with obstruction/lower urinary tract symptoms  - POCT URINE DIPSTICK WITH MICROSCOPE, AUTOMATED  - PSA, Total (Diagnostic); Future    2. Epididymal cyst     Plan:  We will have him return in six months after a PSA for prostate exam.  Continue tamsulosin.  Observation of the epididymal cyst as this is not bothersome to him.    Follow-up:  PSA in six months and follow-up after for ROMAN.

## 2023-09-12 ENCOUNTER — HOSPITAL ENCOUNTER (EMERGENCY)
Facility: HOSPITAL | Age: 63
Discharge: HOME | End: 2023-09-12
Attending: INTERNAL MEDICINE
Payer: MEDICAID

## 2023-09-12 VITALS
RESPIRATION RATE: 20 BRPM | WEIGHT: 181.88 LBS | SYSTOLIC BLOOD PRESSURE: 154 MMHG | TEMPERATURE: 97 F | DIASTOLIC BLOOD PRESSURE: 88 MMHG | HEIGHT: 74 IN | HEART RATE: 80 BPM | OXYGEN SATURATION: 96 % | BODY MASS INDEX: 23.34 KG/M2

## 2023-09-12 DIAGNOSIS — K52.9 COLITIS: Primary | ICD-10-CM

## 2023-09-12 LAB
ALBUMIN SERPL-MCNC: 4 G/DL (ref 3.4–4.8)
ALBUMIN/GLOB SERPL: 1.1 RATIO (ref 1.1–2)
ALP SERPL-CCNC: 95 UNIT/L (ref 40–150)
ALT SERPL-CCNC: 53 UNIT/L (ref 0–55)
APPEARANCE UR: CLEAR
AST SERPL-CCNC: 64 UNIT/L (ref 5–34)
BACTERIA #/AREA URNS AUTO: ABNORMAL /HPF
BASOPHILS # BLD AUTO: 0.03 X10(3)/MCL
BASOPHILS NFR BLD AUTO: 0.5 %
BILIRUB SERPL-MCNC: 0.3 MG/DL
BILIRUB UR QL STRIP.AUTO: NEGATIVE
BUN SERPL-MCNC: 9.9 MG/DL (ref 8.4–25.7)
CALCIUM SERPL-MCNC: 9.9 MG/DL (ref 8.8–10)
CHLORIDE SERPL-SCNC: 100 MMOL/L (ref 98–107)
CO2 SERPL-SCNC: 28 MMOL/L (ref 23–31)
COLOR UR: YELLOW
CREAT SERPL-MCNC: 1.07 MG/DL (ref 0.73–1.18)
EOSINOPHIL # BLD AUTO: 0.05 X10(3)/MCL (ref 0–0.9)
EOSINOPHIL NFR BLD AUTO: 0.9 %
ERYTHROCYTE [DISTWIDTH] IN BLOOD BY AUTOMATED COUNT: 14.1 % (ref 11.5–17)
GFR SERPLBLD CREATININE-BSD FMLA CKD-EPI: >60 MLS/MIN/1.73/M2
GLOBULIN SER-MCNC: 3.8 GM/DL (ref 2.4–3.5)
GLUCOSE SERPL-MCNC: 104 MG/DL (ref 82–115)
GLUCOSE UR QL STRIP.AUTO: ABNORMAL
HCT VFR BLD AUTO: 55.4 % (ref 42–52)
HGB BLD-MCNC: 18.3 G/DL (ref 14–18)
HYALINE CASTS #/AREA URNS LPF: ABNORMAL /LPF
IMM GRANULOCYTES # BLD AUTO: 0.01 X10(3)/MCL (ref 0–0.04)
IMM GRANULOCYTES NFR BLD AUTO: 0.2 %
KETONES UR QL STRIP.AUTO: ABNORMAL
LEUKOCYTE ESTERASE UR QL STRIP.AUTO: NEGATIVE
LYMPHOCYTES # BLD AUTO: 0.86 X10(3)/MCL (ref 0.6–4.6)
LYMPHOCYTES NFR BLD AUTO: 14.9 %
MCH RBC QN AUTO: 28.5 PG (ref 27–31)
MCHC RBC AUTO-ENTMCNC: 33 G/DL (ref 33–36)
MCV RBC AUTO: 86.4 FL (ref 80–94)
MONOCYTES # BLD AUTO: 0.79 X10(3)/MCL (ref 0.1–1.3)
MONOCYTES NFR BLD AUTO: 13.7 %
MUCOUS THREADS URNS QL MICRO: ABNORMAL /LPF
NEUTROPHILS # BLD AUTO: 4.03 X10(3)/MCL (ref 2.1–9.2)
NEUTROPHILS NFR BLD AUTO: 69.8 %
NITRITE UR QL STRIP.AUTO: NEGATIVE
NRBC BLD AUTO-RTO: 0 %
PH UR STRIP.AUTO: 6 [PH]
PLATELET # BLD AUTO: 179 X10(3)/MCL (ref 130–400)
PMV BLD AUTO: 9.9 FL (ref 7.4–10.4)
POTASSIUM SERPL-SCNC: 3.7 MMOL/L (ref 3.5–5.1)
PROT SERPL-MCNC: 7.8 GM/DL (ref 5.8–7.6)
PROT UR QL STRIP.AUTO: ABNORMAL
RBC # BLD AUTO: 6.41 X10(6)/MCL (ref 4.7–6.1)
RBC #/AREA URNS AUTO: ABNORMAL /HPF
RBC UR QL AUTO: NEGATIVE
SODIUM SERPL-SCNC: 139 MMOL/L (ref 136–145)
SP GR UR STRIP.AUTO: 1.03 (ref 1–1.03)
SQUAMOUS #/AREA URNS LPF: ABNORMAL /HPF
UROBILINOGEN UR STRIP-ACNC: NORMAL
WBC # SPEC AUTO: 5.77 X10(3)/MCL (ref 4.5–11.5)
WBC #/AREA URNS AUTO: ABNORMAL /HPF

## 2023-09-12 PROCEDURE — 81001 URINALYSIS AUTO W/SCOPE: CPT | Performed by: INTERNAL MEDICINE

## 2023-09-12 PROCEDURE — 99284 EMERGENCY DEPT VISIT MOD MDM: CPT | Mod: 25

## 2023-09-12 PROCEDURE — 85025 COMPLETE CBC W/AUTO DIFF WBC: CPT | Performed by: INTERNAL MEDICINE

## 2023-09-12 PROCEDURE — 80053 COMPREHEN METABOLIC PANEL: CPT | Performed by: INTERNAL MEDICINE

## 2023-09-12 RX ORDER — METRONIDAZOLE 500 MG/1
500 TABLET ORAL 3 TIMES DAILY
Qty: 15 TABLET | Refills: 0 | Status: SHIPPED | OUTPATIENT
Start: 2023-09-12 | End: 2023-09-17

## 2023-09-12 RX ORDER — CIPROFLOXACIN 250 MG/1
250 TABLET, FILM COATED ORAL 2 TIMES DAILY
Qty: 10 TABLET | Refills: 0 | Status: SHIPPED | OUTPATIENT
Start: 2023-09-12 | End: 2023-09-17

## 2023-09-12 NOTE — ED PROVIDER NOTES
Encounter Date: 9/12/2023       History     Chief Complaint   Patient presents with    Abdominal Pain     C/o back pain radiating to lower abdomen since Monday. States not constant. Nausea at times, also has diarrhea    Diarrhea    Nausea     Presents with suprapubic pain radiating to his back for the last 2 days. States some prostate problems under the care of urology. Denies hematuria, dysuria, rash, fever or vomiting.    The history is provided by the patient.     Review of patient's allergies indicates:  No Known Allergies  Past Medical History:   Diagnosis Date    High cholesterol     Hypertension      History reviewed. No pertinent surgical history.  History reviewed. No pertinent family history.  Social History     Tobacco Use    Smoking status: Every Day     Current packs/day: 0.50     Types: Cigarettes    Smokeless tobacco: Never   Substance Use Topics    Alcohol use: Yes     Alcohol/week: 6.0 standard drinks of alcohol     Types: 6 Cans of beer per week     Review of Systems   Constitutional:  Negative for fever.   HENT:  Negative for sore throat.    Respiratory:  Negative for shortness of breath.    Cardiovascular:  Negative for chest pain.   Gastrointestinal:  Positive for abdominal pain. Negative for nausea.   Genitourinary:  Positive for difficulty urinating. Negative for dysuria.   Musculoskeletal:  Positive for back pain.   Skin:  Negative for rash.   Neurological:  Negative for weakness.   Hematological:  Does not bruise/bleed easily.   All other systems reviewed and are negative.      Physical Exam     Initial Vitals [09/12/23 0855]   BP Pulse Resp Temp SpO2   (!) 154/88 80 20 97.2 °F (36.2 °C) 96 %      MAP       --         Physical Exam    Nursing note and vitals reviewed.  Constitutional: He appears well-developed and well-nourished. No distress.   HENT:   Head: Normocephalic and atraumatic.   Mouth/Throat: Oropharynx is clear and moist.   Eyes: Conjunctivae and EOM are normal. Pupils are equal,  round, and reactive to light.   Neck: Neck supple.   Normal range of motion.  Cardiovascular:  Normal rate, regular rhythm, normal heart sounds and intact distal pulses.           Pulmonary/Chest: Breath sounds normal. No respiratory distress.   Abdominal: Abdomen is soft. Bowel sounds are normal. He exhibits no distension. There is abdominal tenderness (Suprapubic). There is no rebound and no guarding.   Musculoskeletal:         General: No edema. Normal range of motion.      Cervical back: Normal range of motion and neck supple.     Neurological: He is alert and oriented to person, place, and time. He has normal strength. GCS score is 15. GCS eye subscore is 4. GCS verbal subscore is 5. GCS motor subscore is 6.   Skin: Skin is warm and dry. No rash noted.   Psychiatric: His behavior is normal.         ED Course   Procedures  Labs Reviewed   COMPREHENSIVE METABOLIC PANEL - Abnormal; Notable for the following components:       Result Value    Protein Total 7.8 (*)     Globulin 3.8 (*)     Aspartate Aminotransferase 64 (*)     All other components within normal limits   URINALYSIS - Abnormal; Notable for the following components:    Specific Gravity, UA 1.031 (*)     Protein, UA 1+ (*)     Glucose, UA Trace (*)     Ketones, UA Trace (*)     Squamous Epithelial Cells, UA Trace (*)     Mucous, UA Few (*)     Hyaline Casts, UA 0-2 (*)     All other components within normal limits   CBC WITH DIFFERENTIAL - Abnormal; Notable for the following components:    RBC 6.41 (*)     Hgb 18.3 (*)     Hct 55.4 (*)     All other components within normal limits   CBC W/ AUTO DIFFERENTIAL    Narrative:     The following orders were created for panel order CBC auto differential.  Procedure                               Abnormality         Status                     ---------                               -----------         ------                     CBC with Differential[[id number]]        Abnormal            Final result                  Please view results for these tests on the individual orders.   EXTRA TUBES    Narrative:     The following orders were created for panel order EXTRA TUBES.  Procedure                               Abnormality         Status                     ---------                               -----------         ------                     Light Blue Top Hold[[id number]]                              In process                 Gold Top Hold[[id number]]                                   In process                   Please view results for these tests on the individual orders.   LIGHT BLUE TOP HOLD   GOLD TOP HOLD          Imaging Results              CT Abdomen Pelvis  Without Contrast (Final result)  Result time 09/12/23 10:08:48      Final result by Jayden Turcios MD (09/12/23 10:08:48)                   Impression:      Trace inflammatory change surrounds the transverse and splenic flexure of the colon.  Mild colitis is not excluded.    The bladder wall is prominent.  Correlate with urinalysis.    Prominent lymph node adjacent to the cecum anterolateral (series 4, image 91) may represent diverticula, however this is not certain. There is an additional prominent lymph nodes draining the right lower quadrant (series 4, image 84 at 1 cm). Recommend further evaluation with colonoscopy.      Electronically signed by: Jayden Turcios  Date:    09/12/2023  Time:    10:08               Narrative:    EXAMINATION:  CT ABDOMEN PELVIS WITHOUT CONTRAST    CLINICAL HISTORY:  Abdominal pain, acute, nonlocalized;    TECHNIQUE:  Multidetector non-contrast axial CT images of the abdomen and pelvis were obtained with coronal and sagittal reconstructions.    Automatic exposure control was utilized to reduce the patient's radiation dose.    DLP= 506    COMPARISON:  No prior imaging available for comparison.    FINDINGS:  01. HEPATOBILIARY: No focal hepatic lesion is identified, however evaluation is limited secondary to lack of IV  contrast. The gallbladder is normal.    02. SPLEEN: Normal    03. PANCREAS: No focal masses or ductal dilatation.    04. ADRENALS: No adrenal nodules.    05. KIDNEYS: The right kidney demonstrates no stone, hydronephrosis, or hydroureter. No focal mass identified. The left kidney demonstrates no stone, hydronephrosis, or hydroureter. No focal mass identified.    06. LYMPHADENOPATHY/RETROPERITONEUM: There is no retroperitoneal lymphadenopathy. The abdominal aorta is normal in course and caliber.    07. BOWEL: Trace inflammatory change surrounds the transverse and splenic flexure of the colon.  Prominent lymph node adjacent to the cecum anterolateral (series 4, image 91) may represent diverticula, however this is not certain.  There is an additional prominent lymph nodes draining the right lower quadrant (series 4, image 84 at 1 cm).  Recommend further evaluation with colonoscopy.    08. PELVIC VISCERA: Bladder wall is prominent.    09. PELVIC LYMPH NODES: No lymphadenopathy.    10. PERITONEUM/ABDOMINAL WALL: No ascites or implant.    11. SKELETAL: No aggressive appearing lytic/blastic lesion. No acute fractures, subluxations or dislocations.  Scoliotic curvature of the spine.    12. LUNG BASES: The visualized lungs are unremarkable.                                       Medications - No data to display  Medical Decision Making  Amount and/or Complexity of Data Reviewed  Labs: ordered. Decision-making details documented in ED Course.  Radiology: ordered and independent interpretation performed. Decision-making details documented in ED Course.                               Clinical Impression:   Final diagnoses:  [K52.9] Colitis (Primary)        ED Disposition Condition    Discharge Stable          ED Prescriptions       Medication Sig Dispense Start Date End Date Auth. Provider    ciprofloxacin HCl (CIPRO) 250 MG tablet Take 1 tablet (250 mg total) by mouth 2 (two) times daily. for 5 days 10 tablet 9/12/2023 9/17/2023  Chu Moore MD    metroNIDAZOLE (FLAGYL) 500 MG tablet Take 1 tablet (500 mg total) by mouth 3 (three) times daily. for 5 days 15 tablet 9/12/2023 9/17/2023 Chu Moore MD          Follow-up Information       Follow up With Specialties Details Why Contact Info    [facility] Emergency Medicine  If symptoms worsen [address] [place] [id number] [phone]    [facility]  Schedule an appointment as soon as possible for a visit in 1 month  [address] [place] [phone]             Chu Moore MD  09/12/23 8191

## 2023-12-04 DIAGNOSIS — H91.93 BILATERAL HEARING LOSS: Primary | ICD-10-CM

## 2024-01-31 ENCOUNTER — OFFICE VISIT (OUTPATIENT)
Dept: OTOLARYNGOLOGY | Facility: CLINIC | Age: 64
End: 2024-01-31
Payer: MEDICAID

## 2024-01-31 VITALS
DIASTOLIC BLOOD PRESSURE: 81 MMHG | SYSTOLIC BLOOD PRESSURE: 149 MMHG | RESPIRATION RATE: 18 BRPM | HEART RATE: 67 BPM | WEIGHT: 198.13 LBS | BODY MASS INDEX: 25.43 KG/M2 | HEIGHT: 74 IN | TEMPERATURE: 98 F

## 2024-01-31 DIAGNOSIS — N40.1 BPH WITH OBSTRUCTION/LOWER URINARY TRACT SYMPTOMS: Primary | ICD-10-CM

## 2024-01-31 DIAGNOSIS — N13.8 BPH WITH OBSTRUCTION/LOWER URINARY TRACT SYMPTOMS: Primary | ICD-10-CM

## 2024-01-31 DIAGNOSIS — H69.90 DYSFUNCTION OF EUSTACHIAN TUBE, UNSPECIFIED LATERALITY: ICD-10-CM

## 2024-01-31 DIAGNOSIS — H91.90 HEARING LOSS, UNSPECIFIED HEARING LOSS TYPE, UNSPECIFIED LATERALITY: Primary | ICD-10-CM

## 2024-01-31 PROCEDURE — 92504 EAR MICROSCOPY EXAMINATION: CPT | Mod: PBBFAC | Performed by: NURSE PRACTITIONER

## 2024-01-31 PROCEDURE — 99203 OFFICE O/P NEW LOW 30 MIN: CPT | Mod: 25,S$PBB,, | Performed by: NURSE PRACTITIONER

## 2024-01-31 PROCEDURE — 4010F ACE/ARB THERAPY RXD/TAKEN: CPT | Mod: CPTII,,, | Performed by: NURSE PRACTITIONER

## 2024-01-31 PROCEDURE — 3077F SYST BP >= 140 MM HG: CPT | Mod: CPTII,,, | Performed by: NURSE PRACTITIONER

## 2024-01-31 PROCEDURE — 99214 OFFICE O/P EST MOD 30 MIN: CPT | Mod: PBBFAC | Performed by: NURSE PRACTITIONER

## 2024-01-31 PROCEDURE — 3079F DIAST BP 80-89 MM HG: CPT | Mod: CPTII,,, | Performed by: NURSE PRACTITIONER

## 2024-01-31 PROCEDURE — 1159F MED LIST DOCD IN RCRD: CPT | Mod: CPTII,,, | Performed by: NURSE PRACTITIONER

## 2024-01-31 PROCEDURE — 92504 EAR MICROSCOPY EXAMINATION: CPT | Mod: S$PBB,,, | Performed by: NURSE PRACTITIONER

## 2024-01-31 PROCEDURE — 3008F BODY MASS INDEX DOCD: CPT | Mod: CPTII,,, | Performed by: NURSE PRACTITIONER

## 2024-01-31 RX ORDER — FLUTICASONE PROPIONATE 50 MCG
2 SPRAY, SUSPENSION (ML) NASAL 2 TIMES DAILY
Qty: 16 G | Refills: 11 | Status: SHIPPED | OUTPATIENT
Start: 2024-01-31 | End: 2024-03-01

## 2024-01-31 NOTE — PROGRESS NOTES
[facility]  Otolaryngology Clinic Note    Anton Avelar  YOB: 1960    Chief Complaint:   Chief Complaint   Patient presents with    Hearing Loss     2 months dealing with hearing loss, check ears        HPI: 01/31/2024: 63 y.o. male referred for hearing loss. He reports this is b/l and has been ongoing for years. He denies recurrent otologic infections but describes having (?) ear elvin placed once. Denies any otologic procedures/surgery. Hx of occupational loud noise exposure and possibly loud music. Denies family hx of HL. Denies nasal congestion, rhinitis, or allergies.     ROS:   10-point review of systems negative except per HPI      Review of patient's allergies indicates:  No Known Allergies    Past Medical History:   Diagnosis Date    High cholesterol     Hypertension        No past surgical history on file.    Social History     Socioeconomic History    Marital status: Single   Tobacco Use    Smoking status: Every Day     Current packs/day: 0.50     Types: Cigarettes    Smokeless tobacco: Never   Substance and Sexual Activity    Alcohol use: Yes     Alcohol/week: 6.0 standard drinks of alcohol     Types: 6 Cans of beer per week    Sexual activity: Yes     Partners: Male       No family history on file.    Outpatient Encounter Medications as of 1/31/2024   Medication Sig Dispense Refill    albuterol (PROVENTIL/VENTOLIN HFA) 90 mcg/actuation inhaler Inhale 1 puff into the lungs every 4 (four) hours as needed.      aspirin (ECOTRIN) 81 MG EC tablet Take 81 mg by mouth once daily.      atorvastatin (LIPITOR) 20 MG tablet Take 20 mg by mouth once daily.      cetirizine (ZYRTEC) 10 MG tablet Take 10 mg by mouth daily as needed.      diclofenac (VOLTAREN) 50 MG EC tablet Take 50 mg by mouth 2 (two) times daily.      FLUoxetine 20 MG capsule Take 20 mg by mouth every morning.      lisinopriL-hydrochlorothiazide (PRINZIDE,ZESTORETIC) 20-12.5 mg per tablet Take 1 tablet by  "mouth.      SYMBICORT 160-4.5 mcg/actuation HFAA Inhale 2 puffs into the lungs 2 (two) times daily.      tiZANidine (ZANAFLEX) 2 MG tablet Take 2 mg by mouth 3 (three) times daily as needed.      traZODone (DESYREL) 50 MG tablet Take 50 mg by mouth nightly as needed.      tamsulosin (FLOMAX) 0.4 mg Cap Take 1 capsule (0.4 mg total) by mouth once daily. 90 capsule 3     No facility-administered encounter medications on file as of 1/31/2024.       Physical Exam:  Vitals:    01/31/24 1124 01/31/24 1127   BP: (!) 155/82 (!) 149/81   BP Location: Left arm Right arm   Patient Position: Sitting Sitting   BP Method: Medium (Automatic) Large (Automatic)   Pulse: 67    Resp: 18    Temp: 98.1 °F (36.7 °C)    Weight: 89.9 kg (198 lb 1.6 oz)    Height: 6' 2" (1.88 m)        Physical Exam   General: NAD, voice normal  Neuro: AAO, CN II - XII grossly intact  Head/ Face: NCAT, symmetric, sensations intact bilaterally  Eyes: EOMI, PERRL  Ears: externally normal. Unable to hear finger rub b/l. Johnston lateralizes left  AD: EAC patent, TM intact, possible clear effusion, mildly retracted. Unable to autoinsufflate  AS: EAC patent, TM intact, opaque effusion, retracted posteriorly. Unable to autoinsufflate  Nose: bilateral nares patent, midline septum, no rhinorrhea, no external deformity, +ITH  OC/OP: MMM, no intraoral lesions, no trismus, dentition is poor (edentulous upper), no uvular deviation, bilaterally symmetric soft palate elevation, palatoglossus and palatopharyngeal fold wnl; tonsils are symmetric and 1+  Indirect laryngoscopy: deferred due to patient intolerance  Neck: soft, supple, no LAD, normal ROM, no thyromegaly  Respiratory: nonlabored, no wheezing, bilateral chest rise  Cardiovascular: RRR  Gastrointestinal: S NT ND  Skin: warm, no lesions  Musculoskeletal: 5/5 strength  Psych: Appropriate affect/mood     Pertinent Data:  ? LABS:  ? AUDIO:           ? PATH:      Imaging:   I personally reviewed the following " images:        Assessment/Plan:  63 y.o. male with HL, ETD.   - Flonase BID  - Audio  - RTC 6 weeks    Susie Forte NP

## 2024-02-29 ENCOUNTER — OFFICE VISIT (OUTPATIENT)
Dept: UROLOGY | Facility: CLINIC | Age: 64
End: 2024-02-29
Payer: MEDICAID

## 2024-02-29 VITALS
OXYGEN SATURATION: 97 % | WEIGHT: 197.63 LBS | BODY MASS INDEX: 25.36 KG/M2 | HEIGHT: 74 IN | TEMPERATURE: 98 F | SYSTOLIC BLOOD PRESSURE: 129 MMHG | DIASTOLIC BLOOD PRESSURE: 77 MMHG | HEART RATE: 85 BPM

## 2024-02-29 DIAGNOSIS — N13.8 BPH WITH OBSTRUCTION/LOWER URINARY TRACT SYMPTOMS: Primary | ICD-10-CM

## 2024-02-29 DIAGNOSIS — N50.3 EPIDIDYMAL CYST: ICD-10-CM

## 2024-02-29 DIAGNOSIS — N40.1 BPH WITH OBSTRUCTION/LOWER URINARY TRACT SYMPTOMS: Primary | ICD-10-CM

## 2024-02-29 LAB
BILIRUB SERPL-MCNC: NORMAL MG/DL
BLOOD URINE, POC: NORMAL
COLOR, POC UA: YELLOW
GLUCOSE UR QL STRIP: NORMAL
KETONES UR QL STRIP: NORMAL
LEUKOCYTE ESTERASE URINE, POC: NORMAL
NITRITE, POC UA: NORMAL
PH, POC UA: 6
POC RESIDUAL URINE VOLUME: 11 ML (ref 0–100)
PROTEIN, POC: NORMAL
SPECIFIC GRAVITY, POC UA: 1.01
UROBILINOGEN, POC UA: 0.2

## 2024-02-29 PROCEDURE — 99215 OFFICE O/P EST HI 40 MIN: CPT | Mod: PBBFAC | Performed by: UROLOGY

## 2024-02-29 PROCEDURE — 99214 OFFICE O/P EST MOD 30 MIN: CPT | Mod: S$PBB,,, | Performed by: UROLOGY

## 2024-02-29 PROCEDURE — 4010F ACE/ARB THERAPY RXD/TAKEN: CPT | Mod: CPTII,,, | Performed by: UROLOGY

## 2024-02-29 PROCEDURE — 3078F DIAST BP <80 MM HG: CPT | Mod: CPTII,,, | Performed by: UROLOGY

## 2024-02-29 PROCEDURE — 3074F SYST BP LT 130 MM HG: CPT | Mod: CPTII,,, | Performed by: UROLOGY

## 2024-02-29 PROCEDURE — 81001 URINALYSIS AUTO W/SCOPE: CPT | Mod: PBBFAC | Performed by: UROLOGY

## 2024-02-29 PROCEDURE — 1160F RVW MEDS BY RX/DR IN RCRD: CPT | Mod: CPTII,,, | Performed by: UROLOGY

## 2024-02-29 PROCEDURE — 1159F MED LIST DOCD IN RCRD: CPT | Mod: CPTII,,, | Performed by: UROLOGY

## 2024-02-29 PROCEDURE — 51798 US URINE CAPACITY MEASURE: CPT | Mod: PBBFAC | Performed by: UROLOGY

## 2024-02-29 PROCEDURE — 3008F BODY MASS INDEX DOCD: CPT | Mod: CPTII,,, | Performed by: UROLOGY

## 2024-02-29 RX ORDER — PANTOPRAZOLE SODIUM 40 MG/1
40 TABLET, DELAYED RELEASE ORAL EVERY MORNING
COMMUNITY
Start: 2024-02-26

## 2024-02-29 NOTE — PROGRESS NOTES
CC:  Follow-up    HPI:  Anton Avelar is a 63 y.o. male seen for follow-up.  He had a CT scan which showed fluid in the right hemiscrotum.  An ultrasound showed this to be an epididymal cyst.  This is now starting to bother him and he would like to have something done about it.  He is on tamsulosin for urinary complaints and is doing well with that.    Urinalysis:  Results for orders placed or performed in visit on 08/23/23   POCT URINE DIPSTICK WITH MICROSCOPE, AUTOMATED   Result Value Ref Range    Color, UA Yellow     Spec Grav UA >=1.030     pH, UA 5.5     WBC, UA neg     Nitrite, UA neg     Protein, POC neg     Glucose, UA neg     Ketones, UA neg     Urobilinogen, UA 0.2     Bilirubin, POC neg     Blood, UA neg      Microscopic Urinalysis:  WBC:   None per HPF     RBC:    None per HPF     Bacteria:    None per HPF     Squamous epithelial cells:    None per HPF      Crystals:   None    Lab Results:  PSA History:    07/09/18 09:51 01/31/24 12:20   0.2 0.38     Imaging:  Scrotal ultrasound - 25 January 2023:  Large hydrocele with septations versus a spermatocele measuring 6.6 x 7.1 x 2.8 cm in the right hemiscrotum.  3 mm right epididymal head cyst     ROS:  All systems reviewed and are negative except as documented in HPI and/or Assessment and Plan.     Patient Active Problem List:     There is no problem list on file for this patient.       Past Medical History:  Past Medical History:   Diagnosis Date    High cholesterol     Hypertension         Past Surgical History:  History reviewed. No pertinent surgical history.     Family History:  History reviewed. No pertinent family history.     Social History:  Social History     Socioeconomic History    Marital status: Single   Tobacco Use    Smoking status: Every Day     Current packs/day: 0.50     Average packs/day: 0.5 packs/day for 48.2 years (24.1 ttl pk-yrs)     Types: Cigarettes     Start date: 1976    Smokeless tobacco: Never   Substance and Sexual Activity     Alcohol use: Yes     Alcohol/week: 6.0 standard drinks of alcohol     Types: 6 Cans of beer per week    Sexual activity: Yes     Partners: Male        Allergies:  Review of patient's allergies indicates:  No Known Allergies     Objective:  Vitals:    02/29/24 1342   BP: 129/77   Pulse: 85   Temp: 98.3 °F (36.8 °C)     General:  Well developed, well nourished adult male in no acute distress  Abdomen: Soft, nontender, no masses  Extremities:  No clubbing, cyanosis, or edema  Neurologic:  Grossly intact  Musculoskeletal:  Normal tone  Penis:  Circumcised, no lesions  Scrotum:  No hydrocele  Testicles:  Nontender, no masses  Epididymis:  Normal without masses  Prostate exam:  Nontender, symmetrical, no nodules  Rectal:  Normal rectal tone    Assessment:  1. BPH with obstruction/lower urinary tract symptoms  - POCT URINE DIPSTICK WITH MICROSCOPE, AUTOMATED  - POCT Bladder Scan    2. Epididymal cyst  - Case Request Operating Room: EXCISION, SPERMATOCELE     Plan:  Continue tamsulosin.  He desires excision of the epididymal cyst.  This was scheduled for 19 March 2024    Follow-up:  Two weeks after the surgery.

## 2024-02-29 NOTE — PROGRESS NOTES
Pt in Urology clinic today for 6 month f/u with PSA.  CC urine obtained for dipstick UA.  Bladder scan done--11mls in bladder after urinating.  Evaluated per Dr. Renteria.  Surgery scheduled for 3/19/2024.  Consents signed.  Instructions given.  Pt voicing understanding and written instructions given.  RTC for 2 week post-op.

## 2024-03-11 ENCOUNTER — ANESTHESIA EVENT (OUTPATIENT)
Dept: SURGERY | Facility: HOSPITAL | Age: 64
End: 2024-03-11
Payer: MEDICAID

## 2024-03-11 PROBLEM — H02.106 ECTROPION OF LEFT EYE: Status: ACTIVE | Noted: 2024-03-11

## 2024-03-11 PROBLEM — F41.9 ANXIETY: Status: ACTIVE | Noted: 2024-03-11

## 2024-03-11 PROBLEM — F32.A DEPRESSION: Status: ACTIVE | Noted: 2024-03-11

## 2024-03-11 PROBLEM — I10 HYPERTENSION: Status: ACTIVE | Noted: 2024-03-11

## 2024-03-11 PROBLEM — N40.0 BENIGN PROSTATIC HYPERPLASIA: Status: ACTIVE | Noted: 2024-03-11

## 2024-03-11 PROBLEM — Z72.0 TOBACCO USER: Status: ACTIVE | Noted: 2024-03-11

## 2024-03-11 NOTE — ANESTHESIA PREPROCEDURE EVALUATION
Anton Avelar is a 63 y.o. male presenting for EXCISION, SPERMATOCELE (Right) with a history of  -RIGHT EPIDIDYMAL CYST      Vitals:    03/19/24 1008 03/19/24 1011 03/19/24 1340   BP:  (!) 168/90 (!) 160/72   Pulse:  90 88   Resp:   20   Temp:  36.7 °C (98 °F) 36.3 °C (97.3 °F)   TempSrc:  Oral Temporal   SpO2:  97% 99%   Weight: 86.5 kg (190 lb 12.8 oz)         --ENT adding ear tubes and nasal endoscopy to case    -HTN  -HLD  -COPD/ASTHMA? (SYMBICORT/JUANITA USE)  -0.5 PPD SMOKER  -ETOH ABUSE  -MILD ELEVATION IN LFTS  -ANXIETY/DEPRESSION  -BPH  -BACK PAIN, NECK PAIN  -HEARING LOSS  -COLITIS (9/12/23)    BETA-BLOCKER: NONE  ASA LD:     New Orders for Anesthesia: NONE    Patient Active Problem List   Diagnosis    Anxiety    Depression    Back pain    Benign prostatic hyperplasia    Ectropion of left eye    Hypertension    Neck pain    Tobacco user       No past surgical history on file.    Lab Results   Component Value Date    WBC 5.77 09/12/2023    HGB 18.3 (H) 09/12/2023    HCT 55.4 (H) 09/12/2023     09/12/2023       CMP  Sodium Level   Date Value Ref Range Status   09/12/2023 139 136 - 145 mmol/L Final     Potassium Level   Date Value Ref Range Status   09/12/2023 3.7 3.5 - 5.1 mmol/L Final     Carbon Dioxide   Date Value Ref Range Status   09/12/2023 28 23 - 31 mmol/L Final     Blood Urea Nitrogen   Date Value Ref Range Status   09/12/2023 9.9 8.4 - 25.7 mg/dL Final     Creatinine   Date Value Ref Range Status   09/12/2023 1.07 0.73 - 1.18 mg/dL Final     Calcium Level Total   Date Value Ref Range Status   09/12/2023 9.9 8.8 - 10.0 mg/dL Final     Albumin Level   Date Value Ref Range Status   09/12/2023 4.0 3.4 - 4.8 g/dL Final     Bilirubin Total   Date Value Ref Range Status   09/12/2023 0.3 <=1.5 mg/dL Final     Alkaline Phosphatase   Date Value Ref Range Status   09/12/2023 95 40 - 150 unit/L Final     Aspartate Aminotransferase   Date Value Ref Range Status   09/12/2023 64 (H) 5 - 34 unit/L Final      Alanine Aminotransferase   Date Value Ref Range Status   09/12/2023 53 0 - 55 unit/L Final     eGFR   Date Value Ref Range Status   09/12/2023 >60 mls/min/1.73/m2 Final       Lab Results   Component Value Date    PROTIME 13.3 08/20/2019    INR 1.02 08/20/2019       Lab Results   Component Value Date    PTT 26.9 08/20/2019     EKG 1/16/24    ECHO 8/21/19:        NUCLEAR STRESS 2/16/24:      CIS PROGRESS NOTE 2/20/24      Current Outpatient Medications   Medication Instructions    albuterol (PROVENTIL/VENTOLIN HFA) 90 mcg/actuation inhaler 1 puff, Inhalation, Every 4 hours PRN    aspirin (ECOTRIN) 81 mg, Oral, Daily    atorvastatin (LIPITOR) 20 mg, Oral, Daily    cetirizine (ZYRTEC) 10 mg, Oral, Daily PRN    diclofenac (VOLTAREN) 50 mg, Oral, 2 times daily    FLUoxetine 20 mg, Oral, Every morning    lisinopriL-hydrochlorothiazide (PRINZIDE,ZESTORETIC) 20-12.5 mg per tablet 1 tablet, Oral    pantoprazole (PROTONIX) 40 mg, Oral, Every morning    SYMBICORT 160-4.5 mcg/actuation HFAA 2 puffs, Inhalation, 2 times daily    tamsulosin (FLOMAX) 0.4 mg, Oral, Daily    tiZANidine (ZANAFLEX) 2 mg, Oral, 3 times daily PRN    traZODone (DESYREL) 50 mg, Oral, Nightly PRN       Past anesthesia records: NONE      Pre-op Assessment    I have reviewed the Patient Summary Reports.     I have reviewed the Nursing Notes. I have reviewed the NPO Status.   I have reviewed the Medications.     Review of Systems  Anesthesia Hx:  No problems with previous Anesthesia   History of prior surgery of interest to airway management or planning:          Denies Family Hx of Anesthesia complications.    Denies Personal Hx of Anesthesia complications.                    Hematology/Oncology:  Hematology Normal   Oncology Normal                                   EENT/Dental:  EENT/Dental Normal           Cardiovascular:  Cardiovascular Normal                                            Pulmonary:  Pulmonary Normal                        Renal/:  Renal/ Normal                 Hepatic/GI:  Hepatic/GI Normal                 Musculoskeletal:  Musculoskeletal Normal                Neurological:  Neurology Normal                                      Endocrine:  Endocrine Normal            Dermatological:  Skin Normal    Psych:  Psychiatric Normal                    Physical Exam  General: Well nourished, Cooperative, Alert and Oriented    Airway:  Mallampati: I / I  Mouth Opening: Normal  TM Distance: Normal  Tongue: Normal  Neck ROM: Normal ROM    Dental:  Intact        Anesthesia Plan  Type of Anesthesia, risks & benefits discussed:    Anesthesia Type: Gen Supraglottic Airway  Intra-op Monitoring Plan: Standard ASA Monitors  Post Op Pain Control Plan: multimodal analgesia and IV/PO Opioids PRN  Induction:  IV  Airway Plan: Direct  Informed Consent: Informed consent signed with the Patient and all parties understand the risks and agree with anesthesia plan.  All questions answered. Patient consented to blood products? No  ASA Score: 3  Day of Surgery Review of History & Physical: H&P Update referred to the surgeon/provider.    Ready For Surgery From Anesthesia Perspective.     .

## 2024-03-12 NOTE — H&P (VIEW-ONLY)
[facility]  Otolaryngology Clinic Note    Anton Avelar  YOB: 1960    Chief Complaint:   Chief Complaint   Patient presents with    Hearing Loss     2 months dealing with hearing loss, check ears        HPI: 01/31/2024: 63 y.o. male referred for hearing loss. He reports this is b/l and has been ongoing for years. He denies recurrent otologic infections but describes having (?) ear elvin placed once. Denies any otologic procedures/surgery. Hx of occupational loud noise exposure and possibly loud music. Denies family hx of HL. Denies nasal congestion, rhinitis, or allergies.     3/13/24: Very frustrated by hearing loss. Denies improvement. Endorses compliance with flonase. Smoker of 1/2ppd, states he has cut back. Denies any type B symptoms. Denies nasal symptoms, dysphagia, or dysphonia.     ROS:   10-point review of systems negative except per HPI      Review of patient's allergies indicates:  No Known Allergies    Past Medical History:   Diagnosis Date    High cholesterol     Hypertension        No past surgical history on file.    Social History     Socioeconomic History    Marital status: Single   Tobacco Use    Smoking status: Every Day     Current packs/day: 0.50     Types: Cigarettes    Smokeless tobacco: Never   Substance and Sexual Activity    Alcohol use: Yes     Alcohol/week: 6.0 standard drinks of alcohol     Types: 6 Cans of beer per week    Sexual activity: Yes     Partners: Male       No family history on file.    Outpatient Encounter Medications as of 1/31/2024   Medication Sig Dispense Refill    albuterol (PROVENTIL/VENTOLIN HFA) 90 mcg/actuation inhaler Inhale 1 puff into the lungs every 4 (four) hours as needed.      aspirin (ECOTRIN) 81 MG EC tablet Take 81 mg by mouth once daily.      atorvastatin (LIPITOR) 20 MG tablet Take 20 mg by mouth once daily.      cetirizine (ZYRTEC) 10 MG tablet Take 10 mg by mouth daily as needed.      diclofenac (VOLTAREN) 50 MG  "EC tablet Take 50 mg by mouth 2 (two) times daily.      FLUoxetine 20 MG capsule Take 20 mg by mouth every morning.      lisinopriL-hydrochlorothiazide (PRINZIDE,ZESTORETIC) 20-12.5 mg per tablet Take 1 tablet by mouth.      SYMBICORT 160-4.5 mcg/actuation HFAA Inhale 2 puffs into the lungs 2 (two) times daily.      tiZANidine (ZANAFLEX) 2 MG tablet Take 2 mg by mouth 3 (three) times daily as needed.      traZODone (DESYREL) 50 MG tablet Take 50 mg by mouth nightly as needed.      tamsulosin (FLOMAX) 0.4 mg Cap Take 1 capsule (0.4 mg total) by mouth once daily. 90 capsule 3     No facility-administered encounter medications on file as of 1/31/2024.       Physical Exam:  Vitals:    01/31/24 1124 01/31/24 1127   BP: (!) 155/82 (!) 149/81   BP Location: Left arm Right arm   Patient Position: Sitting Sitting   BP Method: Medium (Automatic) Large (Automatic)   Pulse: 67    Resp: 18    Temp: 98.1 °F (36.7 °C)    Weight: 89.9 kg (198 lb 1.6 oz)    Height: 6' 2" (1.88 m)        Physical Exam   General: NAD, voice normal  Neuro: AAO, CN II - XII grossly intact  Head/ Face: NCAT, symmetric, sensations intact bilaterally  Eyes: EOMI, PERRL  Ears: externally normal. Unable to hear finger rub b/l. Johnston lateralizes left  AD: EAC patent, TM intact, possible clear effusion, mildly retracted. Unable to autoinsufflate  AS: EAC patent, TM intact, opaque effusion, retracted posteriorly. Unable to autoinsufflate  Nose: bilateral nares patent, midline septum, no rhinorrhea, no external deformity, +ITH  OC/OP: MMM, no intraoral lesions, no trismus, dentition is poor (edentulous upper), no uvular deviation, bilaterally symmetric soft palate elevation, palatoglossus and palatopharyngeal fold wnl; tonsils are symmetric and 1+  Indirect laryngoscopy: deferred due to patient intolerance  Neck: soft, supple, no LAD, normal ROM, no thyromegaly  Respiratory: nonlabored, no wheezing, bilateral chest rise  Cardiovascular: RRR  Gastrointestinal: S " NT ND  Skin: warm, no lesions  Musculoskeletal: 5/5 strength  Psych: Appropriate affect/mood     Flexible Fiberoptic Laryngoscopy/Nasopharyngoscopy with Dr. Ireland    Procedure in Detail: Informed consent was obtained from the patient after explanation of procedure, indications, risks and benefits. Flexible endoscopy was performed through the nasal passages. The nasal cavity, nasopharynx, oropharynx, hypopharynx and larynx were adequately visualized. The true vocal cords and arytenoids were examined during phonation and repose.    Anesthesia: Topical Neosynephrine / Lidocaine  Adverse Events: None  Blood loss: none  Condition: good    Findings:  NP/OP: no masses/lesions of NC. There is obliteration of fossa of Rosenmuller b/l, there is generalized erythema/edema and papillomatous tissue of NP b/l and adenoid hypertrophy  BOT/vallecula: no lingual hypertrophy, no masses/lesions, no secretions obscuring visualization  Piriform sinuses/post-cricoid: no masses/lesions, no pooling of secretions  Epiglottis: lingual and laryngeal surfaces within normal limits  Arytenoids/FVFs: no masses/lesions, no edema, bilateral mobility with slight atrophy of left TVC  TVCs: bilateral cord mobility, no masses or lesions  No aspiration, no pooled secretions  No sign of malignancy      Pertinent Data:  ? LABS:  ? AUDIO:             ? PATH:      Imaging:   I personally reviewed the following images:        Assessment/Plan:  63 y.o. male with HL, ETD. Audio with b/l MHL, large conductive component. FFL with NP mass/irregular tissue obstructing b/l ET's.   - OR 3/19 for biopsies of NP & b/l myringotomy/PE tube placement (to coincide with urology procedure)  - CT temporal bones  - CT neck  - OR 3/19/24 for bx of NP mass and b/l PE tube placement    Susie Forte NP

## 2024-03-12 NOTE — PROGRESS NOTES
[facility]  Otolaryngology Clinic Note    Anton Avelar  YOB: 1960    Chief Complaint:   Chief Complaint   Patient presents with    Hearing Loss     2 months dealing with hearing loss, check ears        HPI: 01/31/2024: 63 y.o. male referred for hearing loss. He reports this is b/l and has been ongoing for years. He denies recurrent otologic infections but describes having (?) ear elvin placed once. Denies any otologic procedures/surgery. Hx of occupational loud noise exposure and possibly loud music. Denies family hx of HL. Denies nasal congestion, rhinitis, or allergies.     3/13/24: Very frustrated by hearing loss. Denies improvement. Endorses compliance with flonase. Smoker of 1/2ppd, states he has cut back. Denies any type B symptoms. Denies nasal symptoms, dysphagia, or dysphonia.     ROS:   10-point review of systems negative except per HPI      Review of patient's allergies indicates:  No Known Allergies    Past Medical History:   Diagnosis Date    High cholesterol     Hypertension        No past surgical history on file.    Social History     Socioeconomic History    Marital status: Single   Tobacco Use    Smoking status: Every Day     Current packs/day: 0.50     Types: Cigarettes    Smokeless tobacco: Never   Substance and Sexual Activity    Alcohol use: Yes     Alcohol/week: 6.0 standard drinks of alcohol     Types: 6 Cans of beer per week    Sexual activity: Yes     Partners: Male       No family history on file.    Outpatient Encounter Medications as of 1/31/2024   Medication Sig Dispense Refill    albuterol (PROVENTIL/VENTOLIN HFA) 90 mcg/actuation inhaler Inhale 1 puff into the lungs every 4 (four) hours as needed.      aspirin (ECOTRIN) 81 MG EC tablet Take 81 mg by mouth once daily.      atorvastatin (LIPITOR) 20 MG tablet Take 20 mg by mouth once daily.      cetirizine (ZYRTEC) 10 MG tablet Take 10 mg by mouth daily as needed.      diclofenac (VOLTAREN) 50 MG  "EC tablet Take 50 mg by mouth 2 (two) times daily.      FLUoxetine 20 MG capsule Take 20 mg by mouth every morning.      lisinopriL-hydrochlorothiazide (PRINZIDE,ZESTORETIC) 20-12.5 mg per tablet Take 1 tablet by mouth.      SYMBICORT 160-4.5 mcg/actuation HFAA Inhale 2 puffs into the lungs 2 (two) times daily.      tiZANidine (ZANAFLEX) 2 MG tablet Take 2 mg by mouth 3 (three) times daily as needed.      traZODone (DESYREL) 50 MG tablet Take 50 mg by mouth nightly as needed.      tamsulosin (FLOMAX) 0.4 mg Cap Take 1 capsule (0.4 mg total) by mouth once daily. 90 capsule 3     No facility-administered encounter medications on file as of 1/31/2024.       Physical Exam:  Vitals:    01/31/24 1124 01/31/24 1127   BP: (!) 155/82 (!) 149/81   BP Location: Left arm Right arm   Patient Position: Sitting Sitting   BP Method: Medium (Automatic) Large (Automatic)   Pulse: 67    Resp: 18    Temp: 98.1 °F (36.7 °C)    Weight: 89.9 kg (198 lb 1.6 oz)    Height: 6' 2" (1.88 m)        Physical Exam   General: NAD, voice normal  Neuro: AAO, CN II - XII grossly intact  Head/ Face: NCAT, symmetric, sensations intact bilaterally  Eyes: EOMI, PERRL  Ears: externally normal. Unable to hear finger rub b/l. Johnston lateralizes left  AD: EAC patent, TM intact, possible clear effusion, mildly retracted. Unable to autoinsufflate  AS: EAC patent, TM intact, opaque effusion, retracted posteriorly. Unable to autoinsufflate  Nose: bilateral nares patent, midline septum, no rhinorrhea, no external deformity, +ITH  OC/OP: MMM, no intraoral lesions, no trismus, dentition is poor (edentulous upper), no uvular deviation, bilaterally symmetric soft palate elevation, palatoglossus and palatopharyngeal fold wnl; tonsils are symmetric and 1+  Indirect laryngoscopy: deferred due to patient intolerance  Neck: soft, supple, no LAD, normal ROM, no thyromegaly  Respiratory: nonlabored, no wheezing, bilateral chest rise  Cardiovascular: RRR  Gastrointestinal: S " NT ND  Skin: warm, no lesions  Musculoskeletal: 5/5 strength  Psych: Appropriate affect/mood     Flexible Fiberoptic Laryngoscopy/Nasopharyngoscopy with Dr. Ireland    Procedure in Detail: Informed consent was obtained from the patient after explanation of procedure, indications, risks and benefits. Flexible endoscopy was performed through the nasal passages. The nasal cavity, nasopharynx, oropharynx, hypopharynx and larynx were adequately visualized. The true vocal cords and arytenoids were examined during phonation and repose.    Anesthesia: Topical Neosynephrine / Lidocaine  Adverse Events: None  Blood loss: none  Condition: good    Findings:  NP/OP: no masses/lesions of NC. There is obliteration of fossa of Rosenmuller b/l, there is generalized erythema/edema and papillomatous tissue of NP b/l and adenoid hypertrophy  BOT/vallecula: no lingual hypertrophy, no masses/lesions, no secretions obscuring visualization  Piriform sinuses/post-cricoid: no masses/lesions, no pooling of secretions  Epiglottis: lingual and laryngeal surfaces within normal limits  Arytenoids/FVFs: no masses/lesions, no edema, bilateral mobility with slight atrophy of left TVC  TVCs: bilateral cord mobility, no masses or lesions  No aspiration, no pooled secretions  No sign of malignancy      Pertinent Data:  ? LABS:  ? AUDIO:             ? PATH:      Imaging:   I personally reviewed the following images:        Assessment/Plan:  63 y.o. male with HL, ETD. Audio with b/l MHL, large conductive component. FFL with NP mass/irregular tissue obstructing b/l ET's.   - OR 3/19 for biopsies of NP & b/l myringotomy/PE tube placement (to coincide with urology procedure)  - CT temporal bones  - CT neck  - OR 3/19/24 for bx of NP mass and b/l PE tube placement    Susie Forte NP

## 2024-03-13 ENCOUNTER — CLINICAL SUPPORT (OUTPATIENT)
Dept: AUDIOLOGY | Facility: HOSPITAL | Age: 64
End: 2024-03-13
Payer: MEDICAID

## 2024-03-13 ENCOUNTER — OFFICE VISIT (OUTPATIENT)
Dept: OTOLARYNGOLOGY | Facility: CLINIC | Age: 64
End: 2024-03-13
Payer: MEDICAID

## 2024-03-13 VITALS — HEART RATE: 72 BPM | DIASTOLIC BLOOD PRESSURE: 74 MMHG | TEMPERATURE: 98 F | SYSTOLIC BLOOD PRESSURE: 140 MMHG

## 2024-03-13 DIAGNOSIS — J35.2 HYPERTROPHY OF ADENOID: ICD-10-CM

## 2024-03-13 DIAGNOSIS — H90.6 MIXED CONDUCTIVE AND SENSORINEURAL HEARING LOSS, BILATERAL: Primary | ICD-10-CM

## 2024-03-13 DIAGNOSIS — J39.2 NASOPHARYNGEAL MASS: ICD-10-CM

## 2024-03-13 DIAGNOSIS — H90.6 MIXED CONDUCTIVE AND SENSORINEURAL HEARING LOSS OF BOTH EARS: Primary | ICD-10-CM

## 2024-03-13 DIAGNOSIS — H69.90 DYSFUNCTION OF EUSTACHIAN TUBE, UNSPECIFIED LATERALITY: ICD-10-CM

## 2024-03-13 DIAGNOSIS — H91.90 HEARING LOSS, UNSPECIFIED HEARING LOSS TYPE, UNSPECIFIED LATERALITY: ICD-10-CM

## 2024-03-13 PROCEDURE — 99214 OFFICE O/P EST MOD 30 MIN: CPT | Mod: PBBFAC,25 | Performed by: NURSE PRACTITIONER

## 2024-03-13 PROCEDURE — 1159F MED LIST DOCD IN RCRD: CPT | Mod: CPTII,,, | Performed by: NURSE PRACTITIONER

## 2024-03-13 PROCEDURE — 31575 DIAGNOSTIC LARYNGOSCOPY: CPT | Mod: PBBFAC | Performed by: NURSE PRACTITIONER

## 2024-03-13 PROCEDURE — 92557 COMPREHENSIVE HEARING TEST: CPT | Performed by: AUDIOLOGIST

## 2024-03-13 PROCEDURE — 31575 DIAGNOSTIC LARYNGOSCOPY: CPT | Mod: S$PBB,,, | Performed by: NURSE PRACTITIONER

## 2024-03-13 PROCEDURE — 3077F SYST BP >= 140 MM HG: CPT | Mod: CPTII,,, | Performed by: NURSE PRACTITIONER

## 2024-03-13 PROCEDURE — 99215 OFFICE O/P EST HI 40 MIN: CPT | Mod: 25,S$PBB,, | Performed by: NURSE PRACTITIONER

## 2024-03-13 PROCEDURE — 3078F DIAST BP <80 MM HG: CPT | Mod: CPTII,,, | Performed by: NURSE PRACTITIONER

## 2024-03-13 PROCEDURE — 92567 TYMPANOMETRY: CPT | Performed by: AUDIOLOGIST

## 2024-03-13 PROCEDURE — 4010F ACE/ARB THERAPY RXD/TAKEN: CPT | Mod: CPTII,,, | Performed by: NURSE PRACTITIONER

## 2024-03-13 RX ORDER — BUSPIRONE HYDROCHLORIDE 10 MG/1
TABLET ORAL
COMMUNITY

## 2024-03-13 RX ORDER — LIDOCAINE HYDROCHLORIDE 40 MG/ML
1 INJECTION, SOLUTION RETROBULBAR ONCE
Status: SHIPPED | OUTPATIENT
Start: 2024-03-13

## 2024-03-13 RX ORDER — LORATADINE 10 MG/1
TABLET ORAL
COMMUNITY

## 2024-03-13 RX ORDER — ACYCLOVIR 400 MG/1
TABLET ORAL
COMMUNITY
Start: 2023-10-24

## 2024-03-13 RX ORDER — GUAIFENESIN 600 MG/1
TABLET, EXTENDED RELEASE ORAL
COMMUNITY

## 2024-03-13 RX ORDER — ALFUZOSIN HYDROCHLORIDE 10 MG/1
TABLET, EXTENDED RELEASE ORAL
COMMUNITY

## 2024-03-13 RX ORDER — TRAMADOL HYDROCHLORIDE 50 MG/1
TABLET ORAL
COMMUNITY

## 2024-03-13 RX ORDER — FLUTICASONE PROPIONATE 50 MCG
SPRAY, SUSPENSION (ML) NASAL
COMMUNITY

## 2024-03-13 NOTE — PROGRESS NOTES
The scope used for the exam was:  Flexible scope ENF-P4  Serial Number:  1)    [id number]    []   2)    [id number]    []   3)    [id number]    []   4)    [id number]    [x]   5)    [id number]    []   6)    [id number]    []       The scope used for the exam was:  Rigid scope   Serial Number:  1)   [id number]    []   2)   6282    []   3)   7330    []   4)    3384   []   5)    0824   []   6)    5554   []     7)   7425    []   8)   2240    []   9)   1109    []

## 2024-03-13 NOTE — PROGRESS NOTES
Hearing Evaluation        Patient History: Mr. Avelar is in for a hearing evaluation due to decreased hearing, bilaterally, secondary to ETD. He denies tinnitus, vertigo, or middle ear issues. All additional history is unremarkable.        Test Results:                    Pure Tone Testing:      Right ear:       Moderately severe to profound mixed loss from 250-8kHz. Speech reception threshold is in agreement with puretone findings.  Discrimination score of 92% is considered excellent.        Left ear:          Moderately severe to profound mixed loss from 250-8kHz. Speech reception threshold is in agreement with puretone findings.  Discrimination score of 92% is considered excellent.                                                                            Tympanometry:                                           Right ear:       Type 'B' tymp, normal (2.27mL) volume     Left ear:          Type 'B' tymp, normal (1.83mL) volume                                 Interpretations:      Behavioral test findings indicate a moderately severe to profound mixed hearing loss, bilaterally. Speech reception thresholds obtained at 50dB, AU, and are in agreement with puretone findings. Speech discrimination scores of 92%, AU, are considered excellent.  Immittance measures indicate middle ear pathology, bilaterally.            Recommendations:   Continue with ENT follow up  RTC per ENT

## 2024-03-15 ENCOUNTER — HOSPITAL ENCOUNTER (OUTPATIENT)
Dept: RADIOLOGY | Facility: HOSPITAL | Age: 64
Discharge: HOME | End: 2024-03-15
Attending: NURSE PRACTITIONER
Payer: MEDICAID

## 2024-03-15 DIAGNOSIS — H90.6 MIXED CONDUCTIVE AND SENSORINEURAL HEARING LOSS OF BOTH EARS: ICD-10-CM

## 2024-03-15 PROCEDURE — 70480 CT ORBIT/EAR/FOSSA W/O DYE: CPT | Mod: TC

## 2024-03-19 ENCOUNTER — HOSPITAL ENCOUNTER (OUTPATIENT)
Facility: HOSPITAL | Age: 64
Discharge: HOME | End: 2024-03-19
Attending: UROLOGY | Admitting: UROLOGY
Payer: MEDICAID

## 2024-03-19 ENCOUNTER — ANESTHESIA (OUTPATIENT)
Dept: SURGERY | Facility: HOSPITAL | Age: 64
End: 2024-03-19
Payer: MEDICAID

## 2024-03-19 DIAGNOSIS — J35.2 ADENOID HYPERTROPHY: ICD-10-CM

## 2024-03-19 DIAGNOSIS — N50.3 EPIDIDYMAL CYST: ICD-10-CM

## 2024-03-19 DIAGNOSIS — H90.6 MIXED HEARING LOSS, BILATERAL: ICD-10-CM

## 2024-03-19 DIAGNOSIS — N43.40 SPERMATOCELE: ICD-10-CM

## 2024-03-19 DIAGNOSIS — H65.493 CHRONIC OTITIS MEDIA OF BOTH EARS WITH EFFUSION: Primary | ICD-10-CM

## 2024-03-19 PROCEDURE — 27201423 OPTIME MED/SURG SUP & DEVICES STERILE SUPPLY: Performed by: UROLOGY

## 2024-03-19 PROCEDURE — 63600175 PHARM REV CODE 636 W HCPCS: Performed by: NURSE ANESTHETIST, CERTIFIED REGISTERED

## 2024-03-19 PROCEDURE — 25000003 PHARM REV CODE 250: Performed by: NURSE ANESTHETIST, CERTIFIED REGISTERED

## 2024-03-19 PROCEDURE — 71000016 HC POSTOP RECOV ADDL HR: Performed by: UROLOGY

## 2024-03-19 PROCEDURE — 54840 REMOVE EPIDIDYMIS LESION: CPT | Mod: RT,,, | Performed by: UROLOGY

## 2024-03-19 PROCEDURE — D9220A PRA ANESTHESIA: Mod: CRNA,,, | Performed by: NURSE ANESTHETIST, CERTIFIED REGISTERED

## 2024-03-19 PROCEDURE — 36000707: Performed by: UROLOGY

## 2024-03-19 PROCEDURE — 37000008 HC ANESTHESIA 1ST 15 MINUTES: Performed by: UROLOGY

## 2024-03-19 PROCEDURE — 36000706: Performed by: UROLOGY

## 2024-03-19 PROCEDURE — 71000015 HC POSTOP RECOV 1ST HR: Performed by: UROLOGY

## 2024-03-19 PROCEDURE — 71000033 HC RECOVERY, INTIAL HOUR: Performed by: UROLOGY

## 2024-03-19 PROCEDURE — 88305 TISSUE EXAM BY PATHOLOGIST: CPT | Mod: TC | Performed by: OTOLARYNGOLOGY

## 2024-03-19 PROCEDURE — 25000003 PHARM REV CODE 250: Performed by: SPECIALIST

## 2024-03-19 PROCEDURE — 88304 TISSUE EXAM BY PATHOLOGIST: CPT | Mod: TC | Performed by: UROLOGY

## 2024-03-19 PROCEDURE — 63600175 PHARM REV CODE 636 W HCPCS: Performed by: SPECIALIST

## 2024-03-19 PROCEDURE — 25000003 PHARM REV CODE 250: Performed by: UROLOGY

## 2024-03-19 PROCEDURE — 25000003 PHARM REV CODE 250

## 2024-03-19 PROCEDURE — D9220A PRA ANESTHESIA: Mod: ANES,,, | Performed by: SPECIALIST

## 2024-03-19 PROCEDURE — 37000009 HC ANESTHESIA EA ADD 15 MINS: Performed by: UROLOGY

## 2024-03-19 DEVICE — VENT TUBE 1010055 5PK ARMSTRONG GROM SIL
Type: IMPLANTABLE DEVICE | Site: EAR | Status: FUNCTIONAL
Brand: ARMSTRONG

## 2024-03-19 RX ORDER — OXYCODONE AND ACETAMINOPHEN 5; 325 MG/1; MG/1
2 TABLET ORAL ONCE
Status: COMPLETED | OUTPATIENT
Start: 2024-03-19 | End: 2024-03-19

## 2024-03-19 RX ORDER — FENTANYL CITRATE 50 UG/ML
INJECTION, SOLUTION INTRAMUSCULAR; INTRAVENOUS
Status: DISCONTINUED | OUTPATIENT
Start: 2024-03-19 | End: 2024-03-19

## 2024-03-19 RX ORDER — OFLOXACIN 3 MG/ML
SOLUTION/ DROPS OPHTHALMIC
Status: DISCONTINUED | OUTPATIENT
Start: 2024-03-19 | End: 2024-03-19 | Stop reason: HOSPADM

## 2024-03-19 RX ORDER — CEFAZOLIN 2 G/1
INJECTION, POWDER, FOR SOLUTION INTRAMUSCULAR; INTRAVENOUS
Status: DISCONTINUED | OUTPATIENT
Start: 2024-03-19 | End: 2024-03-19

## 2024-03-19 RX ORDER — HYDROMORPHONE HYDROCHLORIDE 1 MG/ML
0.5 INJECTION, SOLUTION INTRAMUSCULAR; INTRAVENOUS; SUBCUTANEOUS EVERY 5 MIN PRN
Status: ACTIVE | OUTPATIENT
Start: 2024-03-19

## 2024-03-19 RX ORDER — DIPHENHYDRAMINE HYDROCHLORIDE 50 MG/ML
25 INJECTION INTRAMUSCULAR; INTRAVENOUS ONCE AS NEEDED
Status: ACTIVE | OUTPATIENT
Start: 2024-03-19 | End: 2035-08-16

## 2024-03-19 RX ORDER — ONDANSETRON HYDROCHLORIDE 2 MG/ML
INJECTION, SOLUTION INTRAVENOUS
Status: DISCONTINUED | OUTPATIENT
Start: 2024-03-19 | End: 2024-03-19

## 2024-03-19 RX ORDER — HYDROMORPHONE HYDROCHLORIDE 1 MG/ML
0.2 INJECTION, SOLUTION INTRAMUSCULAR; INTRAVENOUS; SUBCUTANEOUS EVERY 5 MIN PRN
Status: ACTIVE | OUTPATIENT
Start: 2024-03-19

## 2024-03-19 RX ORDER — MIDAZOLAM HYDROCHLORIDE 1 MG/ML
2 INJECTION, SOLUTION INTRAMUSCULAR; INTRAVENOUS ONCE
Status: COMPLETED | OUTPATIENT
Start: 2024-03-19 | End: 2024-03-19

## 2024-03-19 RX ORDER — HYDROCODONE BITARTRATE AND ACETAMINOPHEN 5; 325 MG/1; MG/1
1 TABLET ORAL EVERY 6 HOURS PRN
Qty: 20 TABLET | Refills: 0 | OUTPATIENT
Start: 2024-03-19 | End: 2024-03-29

## 2024-03-19 RX ORDER — SODIUM CHLORIDE, SODIUM LACTATE, POTASSIUM CHLORIDE, CALCIUM CHLORIDE 600; 310; 30; 20 MG/100ML; MG/100ML; MG/100ML; MG/100ML
INJECTION, SOLUTION INTRAVENOUS CONTINUOUS
Status: ACTIVE | OUTPATIENT
Start: 2024-03-19

## 2024-03-19 RX ORDER — ONDANSETRON HYDROCHLORIDE 2 MG/ML
4 INJECTION, SOLUTION INTRAVENOUS ONCE
Status: ACTIVE | OUTPATIENT
Start: 2024-03-19

## 2024-03-19 RX ORDER — DEXAMETHASONE SODIUM PHOSPHATE 4 MG/ML
INJECTION, SOLUTION INTRA-ARTICULAR; INTRALESIONAL; INTRAMUSCULAR; INTRAVENOUS; SOFT TISSUE
Status: DISCONTINUED | OUTPATIENT
Start: 2024-03-19 | End: 2024-03-19

## 2024-03-19 RX ORDER — LIDOCAINE HYDROCHLORIDE 20 MG/ML
INJECTION INTRAVENOUS
Status: DISCONTINUED | OUTPATIENT
Start: 2024-03-19 | End: 2024-03-19

## 2024-03-19 RX ORDER — PROPOFOL 10 MG/ML
INJECTION, EMULSION INTRAVENOUS
Status: DISCONTINUED | OUTPATIENT
Start: 2024-03-19 | End: 2024-03-19

## 2024-03-19 RX ORDER — IPRATROPIUM BROMIDE AND ALBUTEROL SULFATE 2.5; .5 MG/3ML; MG/3ML
3 SOLUTION RESPIRATORY (INHALATION) ONCE AS NEEDED
Status: ACTIVE | OUTPATIENT
Start: 2024-03-19 | End: 2035-08-16

## 2024-03-19 RX ORDER — GLYCOPYRROLATE 0.2 MG/ML
INJECTION INTRAMUSCULAR; INTRAVENOUS
Status: DISCONTINUED | OUTPATIENT
Start: 2024-03-19 | End: 2024-03-19

## 2024-03-19 RX ORDER — MEPERIDINE HYDROCHLORIDE 25 MG/ML
12.5 INJECTION INTRAMUSCULAR; INTRAVENOUS; SUBCUTANEOUS ONCE
Status: ACTIVE | OUTPATIENT
Start: 2024-03-19 | End: 2024-03-20

## 2024-03-19 RX ORDER — PROCHLORPERAZINE EDISYLATE 5 MG/ML
5 INJECTION INTRAMUSCULAR; INTRAVENOUS ONCE AS NEEDED
Status: ACTIVE | OUTPATIENT
Start: 2024-03-19 | End: 2035-08-16

## 2024-03-19 RX ORDER — OXYMETAZOLINE HCL 0.05 %
SPRAY, NON-AEROSOL (ML) NASAL
Status: DISCONTINUED | OUTPATIENT
Start: 2024-03-19 | End: 2024-03-19 | Stop reason: HOSPADM

## 2024-03-19 RX ORDER — OFLOXACIN 3 MG/ML
3 SOLUTION AURICULAR (OTIC) 3 TIMES DAILY
Qty: 5 ML | Refills: 0 | Status: SHIPPED | OUTPATIENT
Start: 2024-03-19 | End: 2024-03-22

## 2024-03-19 RX ADMIN — ONDANSETRON 4 MG: 2 INJECTION INTRAMUSCULAR; INTRAVENOUS at 03:03

## 2024-03-19 RX ADMIN — PROPOFOL 150 MG: 10 INJECTION, EMULSION INTRAVENOUS at 02:03

## 2024-03-19 RX ADMIN — FENTANYL CITRATE 25 MCG: 50 INJECTION INTRAMUSCULAR; INTRAVENOUS at 03:03

## 2024-03-19 RX ADMIN — OXYCODONE HYDROCHLORIDE AND ACETAMINOPHEN 2 TABLET: 5; 325 TABLET ORAL at 05:03

## 2024-03-19 RX ADMIN — CEFAZOLIN 2 G: 2 INJECTION, POWDER, FOR SOLUTION INTRAMUSCULAR; INTRAVENOUS at 02:03

## 2024-03-19 RX ADMIN — SODIUM CHLORIDE, POTASSIUM CHLORIDE, SODIUM LACTATE AND CALCIUM CHLORIDE: 600; 310; 30; 20 INJECTION, SOLUTION INTRAVENOUS at 01:03

## 2024-03-19 RX ADMIN — MIDAZOLAM HYDROCHLORIDE 2 MG: 1 INJECTION, SOLUTION INTRAMUSCULAR; INTRAVENOUS at 01:03

## 2024-03-19 RX ADMIN — FENTANYL CITRATE 50 MCG: 50 INJECTION INTRAMUSCULAR; INTRAVENOUS at 02:03

## 2024-03-19 RX ADMIN — FENTANYL CITRATE 25 MCG: 50 INJECTION INTRAMUSCULAR; INTRAVENOUS at 02:03

## 2024-03-19 RX ADMIN — LIDOCAINE HYDROCHLORIDE 50 MG: 20 INJECTION INTRAVENOUS at 02:03

## 2024-03-19 RX ADMIN — LIDOCAINE HYDROCHLORIDE 75 MG: 20 INJECTION INTRAVENOUS at 03:03

## 2024-03-19 RX ADMIN — PROPOFOL 50 MG: 10 INJECTION, EMULSION INTRAVENOUS at 02:03

## 2024-03-19 RX ADMIN — DEXAMETHASONE SODIUM PHOSPHATE 8 MG: 4 INJECTION, SOLUTION INTRA-ARTICULAR; INTRALESIONAL; INTRAMUSCULAR; INTRAVENOUS; SOFT TISSUE at 02:03

## 2024-03-19 RX ADMIN — GLYCOPYRROLATE 0.2 MG: 0.2 INJECTION INTRAMUSCULAR; INTRAVENOUS at 02:03

## 2024-03-19 NOTE — H&P
CC:  Epididymal cyst    HPI:  Anton Avelar is a 63 y.o. male here for excision of epididymal cyst.   He had a CT scan which showed fluid in the right hemiscrotum.  An ultrasound showed this to be an epididymal cyst.  This is now starting to bother him and he would like to have something done about it.    ROS:  All systems reviewed and are negative except as documented in HPI and/or Assessment and Plan.     Patient Active Problem List:     Patient Active Problem List   Diagnosis    Anxiety    Depression    Back pain    Benign prostatic hyperplasia    Ectropion of left eye    Hypertension    Neck pain    Tobacco user        Past Medical History:  Past Medical History:   Diagnosis Date    High cholesterol     Hypertension         Past Surgical History:  No past surgical history on file.     Family History  No family history on file.     Social History:  Social History     Socioeconomic History    Marital status: Single   Tobacco Use    Smoking status: Every Day     Current packs/day: 0.50     Average packs/day: 0.5 packs/day for 48.2 years (24.1 ttl pk-yrs)     Types: Cigarettes     Start date: 1976    Smokeless tobacco: Never    Tobacco comments:     Slowed down on smoking 6 cigarettes a day   Substance and Sexual Activity    Alcohol use: Not Currently     Alcohol/week: 6.0 standard drinks of alcohol     Types: 6 Cans of beer per week    Sexual activity: Yes     Partners: Male        Medications:  Current Outpatient Medications   Medication Instructions    acyclovir (ZOVIRAX) 400 MG tablet 1 tablet Orally 3 x a day for 10 days    albuterol (PROVENTIL/VENTOLIN HFA) 90 mcg/actuation inhaler 1 puff, Inhalation, Every 4 hours PRN    alfuzosin (UROXATRAL) 10 mg Tb24 1 tablet immediately after the same meal Orally Once a day for 30    aspirin (ECOTRIN) 81 mg, Oral, Daily    atorvastatin (LIPITOR) 20 mg, Oral, Daily    busPIRone (BUSPAR) 10 MG tablet 1 tablet Orally Twice a day    cetirizine (ZYRTEC) 10 mg, Oral, Daily  PRN    diclofenac (VOLTAREN) 50 mg, Oral, 2 times daily    FLUoxetine 20 mg, Oral, Every morning    fluticasone propionate (FLONASE ALLERGY RELIEF) 50 mcg/actuation nasal spray 1 spray in each nostril Nasally Once a day for sinus congestion for 30    guaiFENesin (MUCINEX) 600 mg 12 hr tablet 1 tablet as needed Orally every 12 hrs for 30 days    lisinopriL-hydrochlorothiazide (PRINZIDE,ZESTORETIC) 20-12.5 mg per tablet 1 tablet, Oral    loratadine (CLARITIN) 10 mg tablet 1 tablet Orally Once a day for sinus congestion for 30 day(s)    pantoprazole (PROTONIX) 40 mg, Oral, Every morning    SYMBICORT 160-4.5 mcg/actuation HFAA 2 puffs, Inhalation, 2 times daily    tamsulosin (FLOMAX) 0.4 mg, Oral, Daily    tiZANidine (ZANAFLEX) 2 mg, Oral, 3 times daily PRN    traMADoL (ULTRAM) 50 mg tablet 1 tablet Orally TID    traZODone (DESYREL) 50 mg, Oral, Nightly PRN        Allergies:  Review of patient's allergies indicates:  No Known Allergies     Objective:  There were no vitals filed for this visit.  General:  Well developed, well nourished adult male in no acute distress  Abdomen: Soft, nontender, no masses  Extremities:  No clubbing, cyanosis, or edema  Neurologic:  Grossly intact  Musculoskeletal:  Normal tone  Penis:  Circumcised, no lesions  Testicles:  Nontender, no masses  Epididymis:  There is a right epididymal cyst.      Lab Results:    Recent Labs     03/13/24  1118   CREATININE 1.62*      Assessment:  Right epididymal cyst    Plan:  Excision of right epididymal cyst

## 2024-03-19 NOTE — TRANSFER OF CARE
Anesthesia Transfer of Care Note    Patient: Anton Avelar    Procedure(s) Performed: Procedure(s) (LRB):  EXCISION, SPERMATOCELE (Right)  BIOPSY, NASOPHARYNX  MYRINGOTOMY, WITH TYMPANOSTOMY TUBE INSERTION (Bilateral)    Patient location: PACU    Anesthesia Type: general    Transport from OR: Transported from OR on room air with adequate spontaneous ventilation    Post pain: adequate analgesia    Post assessment: no apparent anesthetic complications and tolerated procedure well    Post vital signs: stable    Level of consciousness: sedated    Nausea/Vomiting: no nausea/vomiting    Complications: none    Transfer of care protocol was followedComments: Report to Phil GILES      Last vitals: Visit Vitals  BP (!) 160/72   Pulse 88   Temp 36.3 °C (97.3 °F) (Temporal)   Resp 20   Wt 86.5 kg (190 lb 12.8 oz)   SpO2 99%   BMI 24.50 kg/m²

## 2024-03-19 NOTE — DISCHARGE SUMMARY
[facility]  Discharge Note  Short Stay    Procedure(s) (LRB):  EXCISION, SPERMATOCELE (Right)  BIOPSY, NASOPHARYNX  MYRINGOTOMY, WITH TYMPANOSTOMY TUBE INSERTION (Bilateral)      OUTCOME: Patient tolerated treatment/procedure well without complication and is now ready for discharge.    DISPOSITION: Home or Self Care    FINAL DIAGNOSIS:  Epididymal cyst, right    FOLLOWUP: In clinic on 3 April 2024 at 11:00    DISCHARGE INSTRUCTIONS:    Discharge Procedure Orders   Lifting restrictions     Remove dressing in 48 hours     Shower on day dressing removed (No bath)        TIME SPENT ON DISCHARGE: 10 minutes

## 2024-03-19 NOTE — OP NOTE
[facility] OTOLARYNGOLOGY OPERATIVE REPORT    Patient Name: Anton Avelar  Date of Admission: 3/19/2024  YOB: 1960  Date of procedure: 03/19/2024    Attending Surgeon: Felix Smith MD    Resident Surgeon(s):   Arlene Ireland MD, HO-III    Pre-operative diagnosis:  1. Adenoid hypertrophy  2. Conductive hearing loss bilaterally  3. Serous otitis media bilaterally   4. Tobacco abuse    Post-operative diagnosis:   1. Adenoid hypertrophy  2. Conductive hearing loss bilaterally  3. Serous otitis media Bilaterally  4. Tobacco abuse      Procedure:  1. Sinonasal endoscopy with nasopharyngeal biopsy  2. Bilateral myringotomy with tympanostomy tube placement     Anesthesia: general anesthesia    Blood Loss: 10cc    Implants:  Green silicone Del Valle tubes in bilateral tympanic membrane    Drains: none    Specimens:   ID Type Source Tests Collected by Time Destination   A : left nasopharyngeal tissue Tissue Nasopharyngeal SPECIMEN TO PATHOLOGY Felix Smith MD 3/19/2024 1535    B : spermatocele Tissue Spermatocele SPECIMEN TO PATHOLOGY Allyson Renteria DO 3/19/2024 1538      Complications: none    Findings:  Large amount of friable occlusive tissue that is obstructing both Eustachian tubes.  Right middle ear effusion, minimal left middle ear effusion.    Indication:  Anton Avelar is a 63 y.o. male with past medical history extensive tobacco abuse presented to clinic for hearing loss and was found to have type B tympanograms bilaterally, conductive hearing loss, and serous effusions on exam. Given these findings, he was scoped in the clinic to evaluate his torus tubarii, and a large friable amount of obliterative adenoid tissue was found.  All treatment options were discussed, including observation, medical management, and surgical intervention. Ultimately, the joint decision was made to proceed with nasopharyngeal biopsy with sinonasal endoscopy and bilateral myringotomy with tube placement. Informed  consent was obtained from patient in preop. All risks, benefits, and alternatives were reviewed, and all questions were answered.     Procedure in detail:  The patient was brought to the operating theater and placed in a supine position.  General endotracheal anesthesia was induced. He was undergoing urologic procedure already.  Timeout was performed confirming correct patient, site, and procedure for ENT portion of case.      First, sinonasal endoscopy was performed using a zero degree telescope. Neither nasal cavity revealed abnormal anatomy until the nasopharynx was breached. The questionable tissue was seen on both side of nasopharynx, greater on the left. Using a grasper pieces of this tissue were taken for biopsy, routine. It should be noted the tissue was extremely vascular. Then, afrin soaked cottonoids were placed and pressure held for hemostasis. Then attention was turned to the ears.     The operating microscope was brought into the view of the left ear.  Using curette, cerumen was removed from the external auditory canal until the tympanic membrane could be viewed.  Using myringotomy blade, an incision was made into the anterior-inferior quadrant.  Clear serous effusion was expressed and suctioned from behind the tympanic membrane.  A green mirza tympanostomy tube was placed.  Floxin  drops were used.  A cotton ball was placed into the external auditory canal meatus.      The same procedure was performed on the right side then. Microscope was advanced to the right ear canal. Using a myringotomy blade an inferior myringotomy was made. Then the effusion was suctioned. Tube placed with drops.     The patient was then left in the care of the urologic team as they were still completing their procedure.      Arlene Ireland MD  U Otolaryngology, -III  3/19/2024 4:24 PM

## 2024-03-19 NOTE — ANESTHESIA PROCEDURE NOTES
Intubation    Date/Time: 3/19/2024 2:20 PM    Performed by: Anton Stacy CRNA  Authorized by: Tanya Vaca MD    Intubation:     Induction:  Intravenous    Intubated:  Postinduction    Mask Ventilation:  Easy with oral airway    Attempts:  1    Attempted By:  CRNA and student    Difficult Airway Encountered?: No      Airway Device:  Supraglottic airway/LMA    Airway Device Size:  5.0    Style/Cuff Inflation:  Cuffed (inflated to minimal occlusive pressure)    Inflation Amount (mL):  18    Placement Verified By:  Capnometry    Complicating Factors:  None    Findings Post-Intubation:  BS equal bilateral

## 2024-03-19 NOTE — INTERVAL H&P NOTE
The patient has been examined and the H&P has been reviewed:    I concur with the findings and no changes have occurred since H&P was written.    Surgery risks, benefits and alternative options discussed and understood by patient/family.  We will be placing bilateral tympanostomy tubes and taking a biopsy of the nasopharynx patient expressed understanding we will make sure to put in are postop order instructions and see him back in clinic.  We greatly appreciate Dr. Castro providing us with the efficient time to help Mr. Avelar with his pathology.    Arlene Ireland MD  U Otolaryngology  HO-III

## 2024-03-19 NOTE — DISCHARGE INSTRUCTIONS
RE: myringostomy with ear tubes: The [facility] will call you with your post-op appointment date and time_.  · No heavy lifting or straining for 2 weeks.  · Keep ears dry.  Put cotton ball with coating of vaseline in ears when showering  · · Call clinic immediately with any nausea/vomiting or go to ER.  · Alternate Tylenol and Ibuprofen over the counter pain medications.                                                                                                                   · If you are experiencing severe pain even with your pain medications, please call the ENT clinic at [phone] to notify your doctor.  · Do not drink alcohol or drive today, or as long as you are on pain medication.  · Start ear drops: ofloxacin (FLOXIN) 0.3 % otic solution- Place 3 drops into both ears 3 times daily. For 3 days  · Notify MD of any moderate to severe pain unrelieved by pain medicine, if you have continuous or severe bleeding, or for any signs of infection including fever above 100.4, excessive redness or swelling, yellow/green foul- smelling drainage, nausea or vomiting. Clinics number is [phone]. If it is after business hours or emergency call [phone] and state Im having post op complications and need to speak to the ENT surgeon on call.  · .  RE: Excision of Spermatocele: · Keep follow up appointment on __April 3, 2024 @ 11:00 AM____ in [facility].  Resume home medications unless otherwise instructed by your doctor.    · No heavy lifting or straining over 10 pounds for 3-4 weeks.    · You may take a shower starting 48 hours after surgery. Wash GENTLY with soap and water (do not scrub) over incision, rinse with water, and pat dry.  Apply bacitracin to stitches to keep the stitches moist    · Do not soak your wound in water for two weeks. Do not take baths, swim, or use a hot tub until your doctor says it is okay.    · Use pain medication as instructed. Do not take Tylenol (acetaminophen) with  your NORCO, since NORCO contains Tylenol as well.    · You may use an ice pack as needed for 20 minutes at a time over your incision site to minimize swelling and help relieve pain.    · Do not drink alcohol or drive today, or as long as you are on pain medication.    · Notify MD of any moderate to severe pain unrelieved by pain medicine, if your incision opens and/or bleeds, or for any signs of infection including fever above 100.4, excessive redness or swelling, yellow/green foul- smelling drainage, nausea or vomiting. Clinics number is [phone]. If it is after business hours or emergency call [phone] and state Im having post op complications and need to speak to the surgeon on call.    · Thanks for choosing [facility]! Have a smooth recovery!

## 2024-03-19 NOTE — OP NOTE
UROLOGY OPERATIVE NOTE        Date of Procedure:   19 March 2024    Pre-op Diagnosis: Epididymal cyst, right    Post-op Diagnosis: Epididymal cyst, right    Procedure: Excision of epididymal cyst, right    Surgeon: Allyson Renteria D.O.    Assistant: None    Anesthesia: General via LMA    Complications: None    Blood Loss: 5ml    Implants: None    Disposition: Patient was taken to the PACU    Condition: Stable    Procedure Details:  Patient was taken to the operating room placed in the supine position.  Following adequate general anesthesia he was prepped and draped in usual sterile fashion. An incision was made down the median raphae on the scrotum using sharp dissection.  This was carried down through the subcutaneous tissue and tunica using electrocautery.  An opening was made in the tunic and a small amount of hydrocele fluid was drained.  The fluid was clear and straw colored. The the testicle was removed from the scrotum.  The appendix epididymis was excised with electrocautery.  The epididymal cyst was identified in the right epididymis and extended up into the right inguinal region.  Using blunt dissection, sharp dissection, and electrocautery the cyst was freed up from the surrounding tissue.  The stump of the epididymis was doubly tied with 0 silk and the epididymal cyst was completely excised. The spermatic cord ran over the top of the epididymal cyst.  This was freed up and dissected free from the cyst.  The edges of the hydrocele sac were then treated with electrocautery for hemostasis and any areas of bleeding were cauterized.  Surgicel powder was placed on the wound and in the scrotal sac.  The testicle was returned to the right hemiscrotum in the correct orientation.  The dartos was closed with a running 3-0 Chromic.  This was carried onto the skin and the skin edges were approximated with a horizontal mattress suture. The wound was dressed with antibiotic ointment, fluffs, and scrotal support.  He  tolerated the procedure well.  There were no complications.  He was taken to the postanesthesia care unit in stable condition.

## 2024-03-21 VITALS
WEIGHT: 190.81 LBS | OXYGEN SATURATION: 95 % | SYSTOLIC BLOOD PRESSURE: 171 MMHG | DIASTOLIC BLOOD PRESSURE: 98 MMHG | HEART RATE: 80 BPM | TEMPERATURE: 98 F | RESPIRATION RATE: 20 BRPM | BODY MASS INDEX: 24.5 KG/M2

## 2024-03-21 LAB
ESTROGEN SERPL-MCNC: NORMAL PG/ML
INSULIN SERPL-ACNC: NORMAL U[IU]/ML
LAB AP CLINICAL INFORMATION: NORMAL
LAB AP GROSS DESCRIPTION: NORMAL
LAB AP REPORT FOOTNOTES: NORMAL
T3RU NFR SERPL: NORMAL %

## 2024-03-21 NOTE — ANESTHESIA POSTPROCEDURE EVALUATION
Anesthesia Post Evaluation    Patient: Anton Avelar    Procedure(s) Performed: Procedure(s) (LRB):  EXCISION, SPERMATOCELE (Right)  BIOPSY, NASOPHARYNX  MYRINGOTOMY, WITH TYMPANOSTOMY TUBE INSERTION (Bilateral)    Final Anesthesia Type: general      Patient location during evaluation: PACU  Patient participation: Yes- Able to Participate  Level of consciousness: awake and responds to stimulation  Post-procedure vital signs: reviewed and stable  Pain management: adequate  Airway patency: patent    PONV status at discharge: No PONV  Anesthetic complications: no      Cardiovascular status: blood pressure returned to baseline  Respiratory status: unassisted  Hydration status: euvolemic  Follow-up not needed.              Vitals Value Taken Time   /98 03/19/24 1730   Temp 36.6 °C (97.9 °F) 03/19/24 1730   Pulse 80 03/19/24 1717   Resp 20 03/19/24 1718   SpO2 95 % 03/19/24 1717         Event Time   Out of Recovery 16:26:00         Pain/Dana Score: No data recorded

## 2024-03-22 PROBLEM — H65.493 CHRONIC OTITIS MEDIA OF BOTH EARS WITH EFFUSION: Status: ACTIVE | Noted: 2024-03-22

## 2024-03-22 PROBLEM — H90.6 MIXED HEARING LOSS, BILATERAL: Status: ACTIVE | Noted: 2024-03-22

## 2024-03-22 PROBLEM — J35.2 ADENOID HYPERTROPHY: Status: ACTIVE | Noted: 2024-03-22

## 2024-03-26 ENCOUNTER — OFFICE VISIT (OUTPATIENT)
Dept: OTOLARYNGOLOGY | Facility: CLINIC | Age: 64
End: 2024-03-26
Payer: MEDICAID

## 2024-03-26 ENCOUNTER — TELEPHONE (OUTPATIENT)
Dept: AUDIOLOGY | Facility: HOSPITAL | Age: 64
End: 2024-03-26
Payer: MEDICAID

## 2024-03-26 VITALS
SYSTOLIC BLOOD PRESSURE: 120 MMHG | WEIGHT: 184.38 LBS | DIASTOLIC BLOOD PRESSURE: 69 MMHG | HEIGHT: 74 IN | BODY MASS INDEX: 23.66 KG/M2 | HEART RATE: 105 BPM | TEMPERATURE: 98 F

## 2024-03-26 DIAGNOSIS — J35.2 ADENOID HYPERTROPHY: ICD-10-CM

## 2024-03-26 DIAGNOSIS — H65.493 CHRONIC OTITIS MEDIA OF BOTH EARS WITH EFFUSION: Primary | ICD-10-CM

## 2024-03-26 PROCEDURE — 99215 OFFICE O/P EST HI 40 MIN: CPT | Mod: PBBFAC | Performed by: STUDENT IN AN ORGANIZED HEALTH CARE EDUCATION/TRAINING PROGRAM

## 2024-03-26 NOTE — PROGRESS NOTES
[facility]  Otolaryngology Clinic Note    Anton Avelar  YOB: 1960    Chief Complaint:   Chief Complaint   Patient presents with    Hearing Loss     2 months dealing with hearing loss, check ears        HPI: 01/31/2024: 63 y.o. male referred for hearing loss. He reports this is b/l and has been ongoing for years. He denies recurrent otologic infections but describes having (?) ear elvin placed once. Denies any otologic procedures/surgery. Hx of occupational loud noise exposure and possibly loud music. Denies family hx of HL. Denies nasal congestion, rhinitis, or allergies.     3/13/24: Very frustrated by hearing loss. Denies improvement. Endorses compliance with flonase. Smoker of 1/2ppd, states he has cut back. Denies any type B symptoms. Denies nasal symptoms, dysphagia, or dysphonia.     3/26/24: here for fu after PE tubes & adenoid biopsy, feels like he is hearing and breathing better. Would like hearing aids.     ROS:   10-point review of systems negative except per HPI      Review of patient's allergies indicates:  No Known Allergies    Past Medical History:   Diagnosis Date    High cholesterol     Hypertension        No past surgical history on file.    Social History     Socioeconomic History    Marital status: Single   Tobacco Use    Smoking status: Every Day     Current packs/day: 0.50     Types: Cigarettes    Smokeless tobacco: Never   Substance and Sexual Activity    Alcohol use: Yes     Alcohol/week: 6.0 standard drinks of alcohol     Types: 6 Cans of beer per week    Sexual activity: Yes     Partners: Male       No family history on file.    Outpatient Encounter Medications as of 1/31/2024   Medication Sig Dispense Refill    albuterol (PROVENTIL/VENTOLIN HFA) 90 mcg/actuation inhaler Inhale 1 puff into the lungs every 4 (four) hours as needed.      aspirin (ECOTRIN) 81 MG EC tablet Take 81 mg by mouth once daily.      atorvastatin (LIPITOR) 20 MG tablet Take 20  "mg by mouth once daily.      cetirizine (ZYRTEC) 10 MG tablet Take 10 mg by mouth daily as needed.      diclofenac (VOLTAREN) 50 MG EC tablet Take 50 mg by mouth 2 (two) times daily.      FLUoxetine 20 MG capsule Take 20 mg by mouth every morning.      lisinopriL-hydrochlorothiazide (PRINZIDE,ZESTORETIC) 20-12.5 mg per tablet Take 1 tablet by mouth.      SYMBICORT 160-4.5 mcg/actuation HFAA Inhale 2 puffs into the lungs 2 (two) times daily.      tiZANidine (ZANAFLEX) 2 MG tablet Take 2 mg by mouth 3 (three) times daily as needed.      traZODone (DESYREL) 50 MG tablet Take 50 mg by mouth nightly as needed.      tamsulosin (FLOMAX) 0.4 mg Cap Take 1 capsule (0.4 mg total) by mouth once daily. 90 capsule 3     No facility-administered encounter medications on file as of 1/31/2024.       Physical Exam:  Vitals:    01/31/24 1124 01/31/24 1127   BP: (!) 155/82 (!) 149/81   BP Location: Left arm Right arm   Patient Position: Sitting Sitting   BP Method: Medium (Automatic) Large (Automatic)   Pulse: 67    Resp: 18    Temp: 98.1 °F (36.7 °C)    Weight: 89.9 kg (198 lb 1.6 oz)    Height: 6' 2" (1.88 m)        Physical Exam   General: NAD, voice normal  Neuro: AAO, CN II - XII grossly intact  Head/ Face: NCAT, symmetric, sensations intact bilaterally  Eyes: EOMI, PERRL  Ears: externally normal. Unable to hear finger rub b/l. Johnston lateralizes left  AD: EAC patent, TM intact, PE tube extruded   AS: EAC patent, TM with PE tube in place, bloody crusting surrounding   Nose: bilateral nares patent, midline septum, no rhinorrhea, no external deformity, +ITH  OC/OP: MMM, no intraoral lesions, no trismus, dentition is poor (edentulous upper), no uvular deviation, bilaterally symmetric soft palate elevation, palatoglossus and palatopharyngeal fold wnl; tonsils are symmetric and 1+  Indirect laryngoscopy: deferred due to patient intolerance  Neck: soft, supple, no LAD, normal ROM, no thyromegaly  Respiratory: nonlabored, no wheezing, " bilateral chest rise  Cardiovascular: RRR  Gastrointestinal: S NT ND  Skin: warm, no lesions  Musculoskeletal: 5/5 strength  Psych: Appropriate affect/mood     Flexible Fiberoptic Laryngoscopy/Nasopharyngoscopy with Dr. Ireland    Procedure in Detail: Informed consent was obtained from the patient after explanation of procedure, indications, risks and benefits. Flexible endoscopy was performed through the nasal passages. The nasal cavity, nasopharynx, oropharynx, hypopharynx and larynx were adequately visualized. The true vocal cords and arytenoids were examined during phonation and repose.    Anesthesia: Topical Neosynephrine / Lidocaine  Adverse Events: None  Blood loss: none  Condition: good    Findings:  NP/OP: no masses/lesions of NC. There is obliteration of fossa of Rosenmuller b/l, there is generalized erythema/edema and papillomatous tissue of NP b/l and adenoid hypertrophy  BOT/vallecula: no lingual hypertrophy, no masses/lesions, no secretions obscuring visualization  Piriform sinuses/post-cricoid: no masses/lesions, no pooling of secretions  Epiglottis: lingual and laryngeal surfaces within normal limits  Arytenoids/FVFs: no masses/lesions, no edema, bilateral mobility with slight atrophy of left TVC  TVCs: bilateral cord mobility, no masses or lesions  No aspiration, no pooled secretions  No sign of malignancy      Pertinent Data:  ? LABS:  ? AUDIO:             ? PATH:    Benign lymphoid hyperplasia     Imaging:   I personally reviewed the following images:        Assessment/Plan:  63 y.o. male with HL, ETD. Audio with b/l MHL, large conductive component. FFL with NP mass/irregular tissue obstructing b/l ET's.   S/p OR 3/13 for PE tubes and nasopharyngeal biopsy. R tube already extruded, atelectatic ear noted in OR. NP biopsy was benign. May ultimatly require tympanoplasty w/ T tubes +/- mastoid in future if worsening symptoms. Flonase BID    -Audiology referral for hearing aids  - flonase BID  - finish  ear drops  - discussed seeing urology for post op urology complaints today  - RTC 4-6 weeks     Chelo Poon MD  [facility] Department of Otolaryngology  [facility]

## 2024-03-26 NOTE — TELEPHONE ENCOUNTER
Patient referred by ENT for a hearing aid consultation. Patient's insurance does not cover hearing aids. Left voicemail informing patient of this information. Information on [place] [employer] program can be mailed if patient is interested in going that route.

## 2024-03-29 ENCOUNTER — HOSPITAL ENCOUNTER (EMERGENCY)
Facility: HOSPITAL | Age: 64
Discharge: HOME | End: 2024-03-29
Attending: EMERGENCY MEDICINE
Payer: MEDICAID

## 2024-03-29 VITALS
SYSTOLIC BLOOD PRESSURE: 130 MMHG | RESPIRATION RATE: 17 BRPM | HEART RATE: 79 BPM | DIASTOLIC BLOOD PRESSURE: 72 MMHG | WEIGHT: 197.56 LBS | TEMPERATURE: 98 F | OXYGEN SATURATION: 97 % | HEIGHT: 74 IN | BODY MASS INDEX: 25.35 KG/M2

## 2024-03-29 DIAGNOSIS — Z48.89 ENCOUNTER FOR POST SURGICAL WOUND CHECK: ICD-10-CM

## 2024-03-29 DIAGNOSIS — G89.18 POST-OPERATIVE PAIN: Primary | ICD-10-CM

## 2024-03-29 LAB
ALBUMIN SERPL-MCNC: 3 G/DL (ref 3.4–4.8)
ALBUMIN/GLOB SERPL: 0.8 RATIO (ref 1.1–2)
ALP SERPL-CCNC: 88 UNIT/L (ref 40–150)
ALT SERPL-CCNC: 12 UNIT/L (ref 0–55)
APPEARANCE UR: CLEAR
AST SERPL-CCNC: 13 UNIT/L (ref 5–34)
BACTERIA #/AREA URNS AUTO: ABNORMAL /HPF
BASOPHILS # BLD AUTO: 0.04 X10(3)/MCL
BASOPHILS NFR BLD AUTO: 0.5 %
BILIRUB SERPL-MCNC: 0.2 MG/DL
BILIRUB UR QL STRIP.AUTO: NEGATIVE
BUN SERPL-MCNC: 13.4 MG/DL (ref 8.4–25.7)
CALCIUM SERPL-MCNC: 8.9 MG/DL (ref 8.8–10)
CHLORIDE SERPL-SCNC: 107 MMOL/L (ref 98–107)
CO2 SERPL-SCNC: 23 MMOL/L (ref 23–31)
COLOR UR AUTO: ABNORMAL
CREAT SERPL-MCNC: 1.08 MG/DL (ref 0.73–1.18)
EOSINOPHIL # BLD AUTO: 0.17 X10(3)/MCL (ref 0–0.9)
EOSINOPHIL NFR BLD AUTO: 2.1 %
ERYTHROCYTE [DISTWIDTH] IN BLOOD BY AUTOMATED COUNT: 12.7 % (ref 11.5–17)
GFR SERPLBLD CREATININE-BSD FMLA CKD-EPI: >60 MLS/MIN/1.73/M2
GLOBULIN SER-MCNC: 3.7 GM/DL (ref 2.4–3.5)
GLUCOSE SERPL-MCNC: 112 MG/DL (ref 82–115)
GLUCOSE UR QL STRIP.AUTO: NORMAL
HCT VFR BLD AUTO: 43.6 % (ref 42–52)
HGB BLD-MCNC: 14.2 G/DL (ref 14–18)
HOLD SPECIMEN: NORMAL
HOLD SPECIMEN: NORMAL
HYALINE CASTS #/AREA URNS LPF: ABNORMAL /LPF
IMM GRANULOCYTES # BLD AUTO: 0.02 X10(3)/MCL (ref 0–0.04)
IMM GRANULOCYTES NFR BLD AUTO: 0.2 %
KETONES UR QL STRIP.AUTO: NEGATIVE
LEUKOCYTE ESTERASE UR QL STRIP.AUTO: NEGATIVE
LYMPHOCYTES # BLD AUTO: 1.56 X10(3)/MCL (ref 0.6–4.6)
LYMPHOCYTES NFR BLD AUTO: 19.2 %
MCH RBC QN AUTO: 27.6 PG (ref 27–31)
MCHC RBC AUTO-ENTMCNC: 32.6 G/DL (ref 33–36)
MCV RBC AUTO: 84.7 FL (ref 80–94)
MONOCYTES # BLD AUTO: 0.75 X10(3)/MCL (ref 0.1–1.3)
MONOCYTES NFR BLD AUTO: 9.2 %
MUCOUS THREADS URNS QL MICRO: ABNORMAL /LPF
NEUTROPHILS # BLD AUTO: 5.59 X10(3)/MCL (ref 2.1–9.2)
NEUTROPHILS NFR BLD AUTO: 68.8 %
NITRITE UR QL STRIP.AUTO: NEGATIVE
NRBC BLD AUTO-RTO: 0 %
PH UR STRIP.AUTO: 5.5 [PH]
PLATELET # BLD AUTO: 267 X10(3)/MCL (ref 130–400)
PMV BLD AUTO: 9.4 FL (ref 7.4–10.4)
POTASSIUM SERPL-SCNC: 3.6 MMOL/L (ref 3.5–5.1)
PROT SERPL-MCNC: 6.7 GM/DL (ref 5.8–7.6)
PROT UR QL STRIP.AUTO: NEGATIVE
RBC # BLD AUTO: 5.15 X10(6)/MCL (ref 4.7–6.1)
RBC #/AREA URNS AUTO: ABNORMAL /HPF
RBC UR QL AUTO: NEGATIVE
SODIUM SERPL-SCNC: 138 MMOL/L (ref 136–145)
SP GR UR STRIP.AUTO: 1.02 (ref 1–1.03)
SQUAMOUS #/AREA URNS LPF: ABNORMAL /HPF
UROBILINOGEN UR STRIP-ACNC: NORMAL
WBC # SPEC AUTO: 8.13 X10(3)/MCL (ref 4.5–11.5)
WBC #/AREA URNS AUTO: ABNORMAL /HPF

## 2024-03-29 PROCEDURE — 80053 COMPREHEN METABOLIC PANEL: CPT | Performed by: EMERGENCY MEDICINE

## 2024-03-29 PROCEDURE — 25000003 PHARM REV CODE 250: Performed by: EMERGENCY MEDICINE

## 2024-03-29 PROCEDURE — 99284 EMERGENCY DEPT VISIT MOD MDM: CPT

## 2024-03-29 PROCEDURE — 81001 URINALYSIS AUTO W/SCOPE: CPT | Performed by: EMERGENCY MEDICINE

## 2024-03-29 PROCEDURE — 85025 COMPLETE CBC W/AUTO DIFF WBC: CPT | Performed by: EMERGENCY MEDICINE

## 2024-03-29 RX ORDER — HYDROCODONE BITARTRATE AND ACETAMINOPHEN 5; 325 MG/1; MG/1
2 TABLET ORAL
Status: COMPLETED | OUTPATIENT
Start: 2024-03-29 | End: 2024-03-29

## 2024-03-29 RX ORDER — LEVOFLOXACIN 750 MG/1
750 TABLET ORAL DAILY
Status: DISCONTINUED | OUTPATIENT
Start: 2024-03-29 | End: 2024-03-29 | Stop reason: HOSPADM

## 2024-03-29 RX ORDER — LEVOFLOXACIN 750 MG/1
750 TABLET ORAL DAILY
Qty: 9 TABLET | Refills: 0 | Status: SHIPPED | OUTPATIENT
Start: 2024-03-30 | End: 2024-04-08

## 2024-03-29 RX ORDER — HYDROCODONE BITARTRATE AND ACETAMINOPHEN 5; 325 MG/1; MG/1
1 TABLET ORAL EVERY 12 HOURS PRN
Qty: 14 TABLET | Refills: 0 | Status: SHIPPED | OUTPATIENT
Start: 2024-03-29 | End: 2024-04-03 | Stop reason: SDUPTHER

## 2024-03-29 RX ADMIN — HYDROCODONE BITARTRATE AND ACETAMINOPHEN 2 TABLET: 5; 325 TABLET ORAL at 02:03

## 2024-03-29 RX ADMIN — LEVOFLOXACIN 750 MG: 750 TABLET, FILM COATED ORAL at 02:03

## 2024-03-29 NOTE — DISCHARGE INSTRUCTIONS
As discussed, you should spend as much time as possible lying on your back to keep the area elevated.  Do as little standing/ walking as possible for now.    Antibiotics and pain medicine as prescribed.      Dr. Renteria will contact you early in the week for short-term follow-up.      The swelling is expected to take many weeks to slowly improve.      Return as needed or if worse.

## 2024-03-29 NOTE — ED PROVIDER NOTES
Encounter Date: 3/29/2024       History     Chief Complaint   Patient presents with    Post-op Problem     C/o right testicle swelling and pain after surgery last Tuesday excision of spermatocele. States swelling of right testicle never was relieved, but left testicle swelling went down.      He underwent excision of a right spermatocele about 10 days ago here, follow-up appointment is scheduled in the near future.  He has completed the pain medicine as prescribed and is concerned about the persistent swelling in the right hemiscrotum and inguinal canal.  Minor bloody drainage on the wound dressing.  No fever, chills, sweats, or other complaints.  He has been up walking and in particular standing in the kitchen cooking.  No other complaints.    The history is provided by the patient. No  was used.     Review of patient's allergies indicates:  No Known Allergies  Past Medical History:   Diagnosis Date    High cholesterol     Hypertension      Past Surgical History:   Procedure Laterality Date    BIOPSY OF NASOPHARYNX  3/19/2024    Procedure: BIOPSY, NASOPHARYNX;  Surgeon: Felix Smith MD;  Location: [facility];  Service: ENT;;    MYRINGOTOMY WITH INSERTION OF VENTILATION TUBE Bilateral 3/19/2024    Procedure: MYRINGOTOMY, WITH TYMPANOSTOMY TUBE INSERTION;  Surgeon: Felix Smith MD;  Location: [facility] OR;  Service: ENT;  Laterality: Bilateral;    SPERMATOCELECTOMY Right 3/19/2024    Procedure: EXCISION, SPERMATOCELE;  Surgeon: Allyson Renteria DO;  Location: [facility];  Service: Urology;  Laterality: Right;  p.e. tubes,5mm 0deg, long graspers(nasal endoscopy)  no frozens     History reviewed. No pertinent family history.  Social History     Tobacco Use    Smoking status: Every Day     Current packs/day: 0.50     Average packs/day: 0.5 packs/day for 48.2 years (24.1 ttl pk-yrs)     Types: Cigarettes     Start date: 1976    Smokeless tobacco: Never    Tobacco comments:     Slowed down on smoking 6  cigarettes a day   Substance Use Topics    Alcohol use: Not Currently     Alcohol/week: 6.0 standard drinks of alcohol     Types: 6 Cans of beer per week     Review of Systems   Constitutional:  Negative for chills and fever.   HENT:  Negative for congestion, facial swelling, nosebleeds and sinus pressure.    Eyes:  Negative for pain and redness.   Respiratory:  Negative for chest tightness, shortness of breath and wheezing.    Cardiovascular:  Negative for chest pain, palpitations and leg swelling.   Gastrointestinal:  Negative for abdominal distention, abdominal pain, diarrhea, nausea and vomiting.   Endocrine: Negative for cold intolerance, polydipsia and polyphagia.   Genitourinary:  Positive for scrotal swelling. Negative for difficulty urinating, dysuria, frequency and hematuria.   Musculoskeletal:  Negative for arthralgias, back pain, myalgias and neck pain.   Skin:  Negative for color change and rash.   Neurological:  Negative for dizziness, weakness, numbness and headaches.   Hematological:  Negative for adenopathy. Does not bruise/bleed easily.   Psychiatric/Behavioral:  Negative for agitation and behavioral problems.    All other systems reviewed and are negative.      Physical Exam     Initial Vitals [03/29/24 1336]   BP Pulse Resp Temp SpO2   135/74 81 20 97.9 °F (36.6 °C) (!) 94 %      MAP       --         Physical Exam    Nursing note and vitals reviewed.  Constitutional: He appears well-developed and well-nourished. He is not diaphoretic. He appears distressed.   Mild discomfort   HENT:   Head: Normocephalic and atraumatic.   Mouth/Throat: Oropharynx is clear and moist. No oropharyngeal exudate.   Eyes: Conjunctivae and EOM are normal. Pupils are equal, round, and reactive to light. Right eye exhibits no discharge. Left eye exhibits no discharge. No scleral icterus.   Neck: Neck supple. No thyromegaly present. No tracheal deviation present. No JVD present.   Normal range of motion.  Cardiovascular:   Normal rate, regular rhythm and normal heart sounds.     Exam reveals no gallop and no friction rub.       No murmur heard.  Pulmonary/Chest: Breath sounds normal. No respiratory distress. He has no wheezes. He has no rhonchi. He has no rales. He exhibits no tenderness.   Abdominal: Abdomen is soft. Bowel sounds are normal. He exhibits no distension and no mass. There is no abdominal tenderness. There is no rebound and no guarding.   Genitourinary:    Genitourinary Comments: Normal uncircumcised phallus and left hemiscrotum.  The right hemiscrotum is involved with a significant degree of swelling and mild tenderness involving all of the right hemiscrotum and extending into the right inguinal canal. There is an intact surgical wound on the inferior aspect, slight clear to bloody exudate at times on the dressing but no ashutosh dehiscence.  No significant warmth. The degree of swelling and tenderness appears as expected for postoperative status considering his lesion and the surgical procedure after discussion with Dr. Renteria.  Photographs included.     Musculoskeletal:         General: No tenderness or edema. Normal range of motion.      Cervical back: Normal range of motion and neck supple.     Lymphadenopathy:     He has no cervical adenopathy.   Neurological: He is alert and oriented to person, place, and time. He has normal strength. No cranial nerve deficit.   Skin: Skin is warm and dry. No rash noted. No erythema.   Psychiatric: He has a normal mood and affect. His behavior is normal. Judgment and thought content normal.                 ED Course   Procedures  Labs Reviewed   COMPREHENSIVE METABOLIC PANEL - Abnormal; Notable for the following components:       Result Value    Albumin Level 3.0 (*)     Globulin 3.7 (*)     Albumin/Globulin Ratio 0.8 (*)     All other components within normal limits   URINALYSIS, REFLEX TO URINE CULTURE - Abnormal; Notable for the following components:    Mucous, UA Trace (*)      All other components within normal limits   CBC WITH DIFFERENTIAL - Abnormal; Notable for the following components:    MCHC 32.6 (*)     All other components within normal limits   CBC W/ AUTO DIFFERENTIAL    Narrative:     The following orders were created for panel order CBC auto differential.  Procedure                               Abnormality         Status                     ---------                               -----------         ------                     CBC with Differential[[id number]]       Abnormal            Final result                 Please view results for these tests on the individual orders.   EXTRA TUBES    Narrative:     The following orders were created for panel order EXTRA TUBES.  Procedure                               Abnormality         Status                     ---------                               -----------         ------                     Light Blue Top Hold[[id number]]                             In process                 Red Top Hold[[id number]]                                    In process                   Please view results for these tests on the individual orders.   LIGHT BLUE TOP HOLD   RED TOP HOLD            2:19 PM Discussed with Dr. Renteria shortly after initial evaluation.  It sounds as though his degree of swelling is as expected considering the magnitude of his initial lesion and the extent of surgical intervention, in particular she had to explore all the way up the right inguinal canal.  It is likely exacerbated by the fact that he has been up walking/ cooking. She advises bedrest and elevation of the operative site. She advises that the postoperative degree of swelling is expected to be quite impressive and to take many weeks to gradually resolve.  She recommends an empiric course Levaquin and she will arrange short-term clinic follow-up early this coming week. Counseled patient in detail on these issues and discharged with the following  instructions:        As discussed, you should spend as much time as possible lying on your back to keep the area elevated.  Do as little standing/ walking as possible for now.    Antibiotics and pain medicine as prescribed.      Dr. Renteria will contact you early in the week for short-term follow-up.      The swelling is expected to take many weeks to slowly improve.      Return as needed or if worse.        Imaging Results    None          Medications   levoFLOXacin tablet 750 mg (750 mg Oral Given 3/29/24 1422)   HYDROcodone-acetaminophen 5-325 mg per tablet 2 tablet (2 tablets Oral Given 3/29/24 1423)     Medical Decision Making  Problems Addressed:  Encounter for post surgical wound check: acute illness or injury  Post-operative pain: chronic illness or injury    Amount and/or Complexity of Data Reviewed  Labs: ordered. Decision-making details documented in ED Course.    Risk  Prescription drug management.      Additional MDM:   Differential Diagnosis:   Wound hematoma, wound infection, routine postop wound swelling/ healing                                    Clinical Impression:  Final diagnoses:  [G89.18] Post-operative pain (Primary)  [Z48.89] Encounter for post surgical wound check          ED Disposition Condition    Discharge Stable          ED Prescriptions       Medication Sig Dispense Start Date End Date Auth. Provider    levoFLOXacin (LEVAQUIN) 750 MG tablet Take 1 tablet (750 mg total) by mouth once daily. for 9 days 9 tablet 3/30/2024 4/8/2024 Adair Clemons MD    HYDROcodone-acetaminophen (NORCO) 5-325 mg per tablet Take 1 tablet by mouth every 12 (twelve) hours as needed for Pain. 14 tablet 3/29/2024 -- Adair Clemons MD          Follow-up Information       Follow up With Specialties Details Why Contact Info [facility] Emergency Medicine  As needed [address] [facility] [address] [phone]             Adair Clemons MD  03/29/24  3242

## 2024-04-03 ENCOUNTER — OFFICE VISIT (OUTPATIENT)
Dept: UROLOGY | Facility: CLINIC | Age: 64
End: 2024-04-03
Payer: MEDICAID

## 2024-04-03 VITALS
HEART RATE: 63 BPM | SYSTOLIC BLOOD PRESSURE: 108 MMHG | RESPIRATION RATE: 18 BRPM | WEIGHT: 197.56 LBS | OXYGEN SATURATION: 99 % | TEMPERATURE: 98 F | HEIGHT: 74 IN | BODY MASS INDEX: 25.35 KG/M2 | DIASTOLIC BLOOD PRESSURE: 70 MMHG

## 2024-04-03 DIAGNOSIS — N50.3 EPIDIDYMAL CYST: Primary | ICD-10-CM

## 2024-04-03 LAB
BILIRUB SERPL-MCNC: NORMAL MG/DL
BLOOD URINE, POC: NEGATIVE
COLOR, POC UA: NORMAL
GLUCOSE UR QL STRIP: NEGATIVE
KETONES UR QL STRIP: NEGATIVE
LEUKOCYTE ESTERASE URINE, POC: NEGATIVE
NITRITE, POC UA: NEGATIVE
PH, POC UA: 5.5
PROTEIN, POC: NEGATIVE
SPECIFIC GRAVITY, POC UA: 1.03
UROBILINOGEN, POC UA: 0.2

## 2024-04-03 PROCEDURE — 4010F ACE/ARB THERAPY RXD/TAKEN: CPT | Mod: CPTII,,, | Performed by: UROLOGY

## 2024-04-03 PROCEDURE — 3078F DIAST BP <80 MM HG: CPT | Mod: CPTII,,, | Performed by: UROLOGY

## 2024-04-03 PROCEDURE — 99215 OFFICE O/P EST HI 40 MIN: CPT | Mod: PBBFAC | Performed by: UROLOGY

## 2024-04-03 PROCEDURE — 1159F MED LIST DOCD IN RCRD: CPT | Mod: CPTII,,, | Performed by: UROLOGY

## 2024-04-03 PROCEDURE — 3074F SYST BP LT 130 MM HG: CPT | Mod: CPTII,,, | Performed by: UROLOGY

## 2024-04-03 PROCEDURE — 1160F RVW MEDS BY RX/DR IN RCRD: CPT | Mod: CPTII,,, | Performed by: UROLOGY

## 2024-04-03 PROCEDURE — 81001 URINALYSIS AUTO W/SCOPE: CPT | Mod: PBBFAC | Performed by: UROLOGY

## 2024-04-03 PROCEDURE — 99024 POSTOP FOLLOW-UP VISIT: CPT | Mod: ,,, | Performed by: UROLOGY

## 2024-04-03 RX ORDER — HYDROCODONE BITARTRATE AND ACETAMINOPHEN 5; 325 MG/1; MG/1
1 TABLET ORAL EVERY 12 HOURS PRN
Qty: 20 TABLET | Refills: 0 | Status: SHIPPED | OUTPATIENT
Start: 2024-04-03

## 2024-04-09 NOTE — PROGRESS NOTES
I have reviewed and agree with the resident's findings, including all diagnostic interpretations and plans as written.     Felix Smith M.D.

## 2024-04-16 NOTE — PROGRESS NOTES
CC:  Postop    HPI:  Anton Avelar is a 63 y.o. male seen for follow-up after excision of an epididymal cyst.  Had excision of an epididymal cyst on 19 March 2024.  He was doing well but then noted increased swelling and went to the emergency room on 29 March 2024.  He was given Levaquin and hydrocodone.  He states that since starting that the swelling and pain have improved.  It was a very large epididymal cyst and stented up into the right inguinal region.    Urinalysis:  Results for orders placed or performed in visit on 04/03/24   POCT URINE DIPSTICK WITH MICROSCOPE, AUTOMATED   Result Value Ref Range    Color, UA Orange     Spec Grav UA 1.030     pH, UA 5.5     WBC, UA Negative     Nitrite, UA Negative     Protein, POC Negative     Glucose, UA Negative     Ketones, UA Negative     Urobilinogen, UA 0.2     Bilirubin, POC Small     Blood, UA Negative      Microscopic Urinalysis:  WBC:   None per HPF     RBC:    None per HPF     Bacteria:    None per HPF     Squamous epithelial cells:    None per HPF      Crystals:   None    Lab Results:  Recent Labs     03/29/24  1423   CREATININE 1.08      ROS:  All systems reviewed and are negative except as documented in HPI and/or Assessment and Plan.     Patient Active Problem List:     Patient Active Problem List   Diagnosis    Anxiety    Depression    Back pain    Benign prostatic hyperplasia    Ectropion of left eye    Hypertension    Neck pain    Tobacco user    Epididymal cyst    Chronic otitis media of both ears with effusion    Mixed hearing loss, bilateral    Adenoid hypertrophy        Past Medical History:  Past Medical History:   Diagnosis Date    High cholesterol     Hypertension         Past Surgical History:  Past Surgical History:   Procedure Laterality Date    BIOPSY OF NASOPHARYNX  3/19/2024    Procedure: BIOPSY, NASOPHARYNX;  Surgeon: Felix Smith MD;  Location: [facility];  Service: ENT;;    MYRINGOTOMY WITH INSERTION OF VENTILATION TUBE Bilateral  3/19/2024    Procedure: MYRINGOTOMY, WITH TYMPANOSTOMY TUBE INSERTION;  Surgeon: Felix Smith MD;  Location: [facility] OR;  Service: ENT;  Laterality: Bilateral;    SPERMATOCELECTOMY Right 3/19/2024    Procedure: EXCISION, SPERMATOCELE;  Surgeon: Allyson Renteria DO;  Location: [facility] OR;  Service: Urology;  Laterality: Right;  p.e. tubes,5mm 0deg, long graspers(nasal endoscopy)  no frozens        Family History:  No family history on file.     Social History:  Social History     Socioeconomic History    Marital status: Single   Tobacco Use    Smoking status: Every Day     Current packs/day: 0.50     Average packs/day: 0.5 packs/day for 48.3 years (24.1 ttl pk-yrs)     Types: Cigarettes     Start date: 1976    Smokeless tobacco: Never    Tobacco comments:     Slowed down on smoking 6 cigarettes a day   Substance and Sexual Activity    Alcohol use: Not Currently     Alcohol/week: 6.0 standard drinks of alcohol     Types: 6 Cans of beer per week    Sexual activity: Yes     Partners: Male        Allergies:  Review of patient's allergies indicates:  No Known Allergies     Objective:  Vitals:    04/03/24 1107   BP: 108/70   Pulse: 63   Resp: 18   Temp: 98.3 °F (36.8 °C)     General:  Well developed, well nourished adult male in no acute distress  Abdomen: Soft, nontender, no masses  Extremities:  No clubbing, cyanosis, or edema  Neurologic:  Grossly intact  Musculoskeletal:  Normal tone  Penis:  Circumcised, no lesions  Scrotum:  The incision is healing well.  There is some swelling present.     Assessment:  1. Epididymal cyst  - POCT URINE DIPSTICK WITH MICROSCOPE, AUTOMATED  - HYDROcodone-acetaminophen (NORCO) 5-325 mg per tablet; Take 1 tablet by mouth every 12 (twelve) hours as needed for Pain.  Dispense: 20 tablet; Refill: 0     Plan:  There is no sign of infection today.  There is swelling present but not more than would be expected for the extent of the surgery.  He requested a refill the pain medication which he  was given.  I also recommended rest and elevation.    Follow-up:  Two weeks.

## 2024-04-18 ENCOUNTER — OFFICE VISIT (OUTPATIENT)
Dept: UROLOGY | Facility: CLINIC | Age: 64
End: 2024-04-18
Payer: MEDICAID

## 2024-04-18 VITALS
RESPIRATION RATE: 20 BRPM | SYSTOLIC BLOOD PRESSURE: 131 MMHG | OXYGEN SATURATION: 97 % | HEART RATE: 71 BPM | BODY MASS INDEX: 24.7 KG/M2 | DIASTOLIC BLOOD PRESSURE: 75 MMHG | TEMPERATURE: 98 F | WEIGHT: 192.38 LBS

## 2024-04-18 DIAGNOSIS — N13.8 BPH WITH OBSTRUCTION/LOWER URINARY TRACT SYMPTOMS: ICD-10-CM

## 2024-04-18 DIAGNOSIS — N40.1 BPH WITH OBSTRUCTION/LOWER URINARY TRACT SYMPTOMS: ICD-10-CM

## 2024-04-18 DIAGNOSIS — N50.3 EPIDIDYMAL CYST: Primary | ICD-10-CM

## 2024-04-18 LAB
BILIRUB SERPL-MCNC: NEGATIVE MG/DL
BLOOD URINE, POC: NEGATIVE
COLOR, POC UA: YELLOW
GLUCOSE UR QL STRIP: NEGATIVE
KETONES UR QL STRIP: NORMAL
LEUKOCYTE ESTERASE URINE, POC: NEGATIVE
NITRITE, POC UA: NEGATIVE
PH, POC UA: 5.5
PROTEIN, POC: NEGATIVE
SPECIFIC GRAVITY, POC UA: 1.02
UROBILINOGEN, POC UA: 0.2

## 2024-04-18 PROCEDURE — 99024 POSTOP FOLLOW-UP VISIT: CPT | Mod: ,,, | Performed by: UROLOGY

## 2024-04-18 PROCEDURE — 81001 URINALYSIS AUTO W/SCOPE: CPT | Mod: PBBFAC | Performed by: UROLOGY

## 2024-04-18 PROCEDURE — 99215 OFFICE O/P EST HI 40 MIN: CPT | Mod: PBBFAC | Performed by: UROLOGY

## 2024-04-18 PROCEDURE — 3078F DIAST BP <80 MM HG: CPT | Mod: CPTII,,, | Performed by: UROLOGY

## 2024-04-18 PROCEDURE — 1160F RVW MEDS BY RX/DR IN RCRD: CPT | Mod: CPTII,,, | Performed by: UROLOGY

## 2024-04-18 PROCEDURE — 1159F MED LIST DOCD IN RCRD: CPT | Mod: CPTII,,, | Performed by: UROLOGY

## 2024-04-18 PROCEDURE — 3075F SYST BP GE 130 - 139MM HG: CPT | Mod: CPTII,,, | Performed by: UROLOGY

## 2024-04-18 PROCEDURE — 4010F ACE/ARB THERAPY RXD/TAKEN: CPT | Mod: CPTII,,, | Performed by: UROLOGY

## 2024-04-18 NOTE — PROGRESS NOTES
CC:  Postop    HPI:  Anton Avelar is a 63 y.o. male seen for postoperative follow-up.    He had excision of an epididymal cyst on 19 March 2024.  He was doing well but then noted increased swelling and went to the emergency room on 29 March 2024.  He was given Levaquin and hydrocodone.  It was a very large epididymal cyst and stented up into the right inguinal region.  The swelling had improved.  He was on tamsulosin for BPH but has not been on that now for a while and he has no complaints.    Urinalysis:  Results for orders placed or performed in visit on 04/18/24   POCT URINE DIPSTICK WITH MICROSCOPE, AUTOMATED   Result Value Ref Range    Color, UA Yellow     Spec Grav UA 1.020     pH, UA 5.5     WBC, UA Negative     Nitrite, UA Negative     Protein, POC Negative     Glucose, UA Negative     Ketones, UA Trace     Urobilinogen, UA 0.2     Bilirubin, POC Negative     Blood, UA Negative      Microscopic Urinalysis:  WBC:   None per HPF     RBC:    None per HPF     Bacteria:    None per HPF     Squamous epithelial cells:    None per HPF      Crystals:   None    Lab Results:  PSA History:     Latest Reference Range & Units 07/09/18 09:51 01/31/24 12:20   Prostate Specific Antigen <=4.00 ng/mL 0.2 0.38     Recent Labs     03/29/24  1423   CREATININE 1.08        ROS:  All systems reviewed and are negative except as documented in HPI and/or Assessment and Plan.     Patient Active Problem List:     Patient Active Problem List   Diagnosis    Anxiety    Depression    Back pain    Benign prostatic hyperplasia    Ectropion of left eye    Hypertension    Neck pain    Tobacco user    Epididymal cyst    Chronic otitis media of both ears with effusion    Mixed hearing loss, bilateral    Adenoid hypertrophy        Past Medical History:  Past Medical History:   Diagnosis Date    High cholesterol     Hypertension         Past Surgical History:  Past Surgical History:   Procedure Laterality Date    BIOPSY OF NASOPHARYNX   3/19/2024    Procedure: BIOPSY, NASOPHARYNX;  Surgeon: Felix Smith MD;  Location: [facility] OR;  Service: ENT;;    MYRINGOTOMY WITH INSERTION OF VENTILATION TUBE Bilateral 3/19/2024    Procedure: MYRINGOTOMY, WITH TYMPANOSTOMY TUBE INSERTION;  Surgeon: Felix Smith MD;  Location: [facility] OR;  Service: ENT;  Laterality: Bilateral;    SPERMATOCELECTOMY Right 3/19/2024    Procedure: EXCISION, SPERMATOCELE;  Surgeon: Allyson Renteria DO;  Location: [facility] OR;  Service: Urology;  Laterality: Right;  p.e. tubes,5mm 0deg, long graspers(nasal endoscopy)  no frozens        Family History:  No family history on file.     Social History:  Social History     Socioeconomic History    Marital status: Single   Tobacco Use    Smoking status: Every Day     Current packs/day: 0.50     Average packs/day: 0.5 packs/day for 48.3 years (24.1 ttl pk-yrs)     Types: Cigarettes     Start date: 1976    Smokeless tobacco: Never    Tobacco comments:     Slowed down on smoking 6 cigarettes a day   Substance and Sexual Activity    Alcohol use: Yes     Alcohol/week: 6.0 standard drinks of alcohol     Types: 6 Cans of beer per week     Comment: occasionally    Sexual activity: Yes     Partners: Male        Allergies:  Review of patient's allergies indicates:  No Known Allergies     Objective:  Vitals:    04/18/24 0906   BP: 131/75   Pulse: 71   Resp: 20   Temp: 98.1 °F (36.7 °C)     General:  Well developed, well nourished adult male in no acute distress  Abdomen: Soft, nontender, no masses  Extremities:  No clubbing, cyanosis, or edema  Neurologic:  Grossly intact  Musculoskeletal:  Normal tone  Penis:  Circumcised, no lesions  Scrotum:  The swelling has improved in the right hemiscrotum.  There still the expected induration present.  No signs of infection.  The incision is healed and the sutures are gone.    Assessment:  1. Epididymal cyst    2. BPH with obstruction/lower urinary tract symptoms  - POCT URINE DIPSTICK WITH MICROSCOPE,  AUTOMATED     Plan:  Reassured that the induration will take some time to resolve but everything is looking at this time.  He will continue off of tamsulosin at this time.  If his urinary symptoms return he will contact us for a prescription.    Follow-up:  PSA in January 2025 and follow-up after for ROMAN.

## 2024-04-18 NOTE — PROGRESS NOTES
Pt seen by Dr. Renteria; Pt instructed to return to clinic in 9 months (Jan 2025) w/PSA; Discharge paperwork given w/pt verbalizing understanding

## 2024-10-20 ENCOUNTER — HOSPITAL ENCOUNTER (EMERGENCY)
Facility: HOSPITAL | Age: 64
Discharge: LEFT BEFORE DISCHARGE | End: 2024-10-20
Attending: EMERGENCY MEDICINE
Payer: MEDICAID

## 2024-10-20 VITALS
WEIGHT: 200.19 LBS | BODY MASS INDEX: 27.11 KG/M2 | TEMPERATURE: 98 F | HEIGHT: 72 IN | RESPIRATION RATE: 20 BRPM | DIASTOLIC BLOOD PRESSURE: 75 MMHG | SYSTOLIC BLOOD PRESSURE: 148 MMHG | HEART RATE: 72 BPM | OXYGEN SATURATION: 97 %

## 2024-10-20 DIAGNOSIS — R05.9 COUGH, UNSPECIFIED TYPE: Primary | ICD-10-CM

## 2024-10-20 PROCEDURE — 99281 EMR DPT VST MAYX REQ PHY/QHP: CPT

## 2024-10-20 NOTE — ED PROVIDER NOTES
Encounter Date: 10/20/2024       History     Chief Complaint   Patient presents with    Cough     Reports cough, eye watering, itchy throat x1 month. Also bilateral wrist pain x3 weeks. No injury. Denies fever or SOB.      Anton Avelar is a 64 y.o. male who presents to the ED with complaints of cough, watery eyes, and itchy throat that started 1 month(s) ago. He denies known sick contacts. Denies fever. He is also complaining of wrist pain that is improved with a heating pad. Denies injury.        The history is provided by the patient. No  was used.     Review of patient's allergies indicates:  No Known Allergies  Past Medical History:   Diagnosis Date    High cholesterol     Hypertension      Past Surgical History:   Procedure Laterality Date    BIOPSY OF NASOPHARYNX  3/19/2024    Procedure: BIOPSY, NASOPHARYNX;  Surgeon: Felix Smith MD;  Location: [facility];  Service: ENT;;    MYRINGOTOMY WITH INSERTION OF VENTILATION TUBE Bilateral 3/19/2024    Procedure: MYRINGOTOMY, WITH TYMPANOSTOMY TUBE INSERTION;  Surgeon: Felix Smith MD;  Location: [facility];  Service: ENT;  Laterality: Bilateral;    SPERMATOCELECTOMY Right 3/19/2024    Procedure: EXCISION, SPERMATOCELE;  Surgeon: Allyson Renteria DO;  Location: [facility];  Service: Urology;  Laterality: Right;  p.e. tubes,5mm 0deg, long graspers(nasal endoscopy)  no frozens     No family history on file.  Social History     Tobacco Use    Smoking status: Every Day     Current packs/day: 0.50     Average packs/day: 0.5 packs/day for 48.8 years (24.4 ttl pk-yrs)     Types: Cigarettes     Start date: 1976    Smokeless tobacco: Never    Tobacco comments:     Slowed down on smoking 6 cigarettes a day   Substance Use Topics    Alcohol use: Yes     Alcohol/week: 6.0 standard drinks of alcohol     Types: 6 Cans of beer per week     Comment: occasionally     Review of Systems   Constitutional:  Negative for chills, fatigue and fever.   HENT:   Positive for postnasal drip and rhinorrhea. Negative for congestion, ear pain, sinus pain and sore throat.    Eyes:  Positive for itching. Negative for pain.   Respiratory:  Positive for cough. Negative for chest tightness and shortness of breath.    Cardiovascular:  Negative for chest pain.   Gastrointestinal:  Negative for abdominal pain, constipation, diarrhea, nausea and vomiting.   Genitourinary:  Negative for dysuria.   Musculoskeletal:  Negative for back pain and joint swelling.   Skin:  Negative for color change and rash.   Neurological:  Negative for dizziness and weakness.   Psychiatric/Behavioral:  Negative for behavioral problems and confusion.        Physical Exam     Initial Vitals [10/20/24 1036]   BP Pulse Resp Temp SpO2   (!) 148/75 72 20 98.3 °F (36.8 °C) 97 %      MAP       --         Physical Exam    Constitutional: He appears well-developed.   HENT:   Head: Normocephalic.   Eyes: Conjunctivae are normal.   Neck:   Normal range of motion.  Musculoskeletal:      Cervical back: Normal range of motion.           ED Course   Procedures  Labs Reviewed - No data to display       Imaging Results    None          Medications - No data to display  Medical Decision Making  Xray ordered when patient arrived to the ED. As I went in to examine patient, he was very mad that he was here for over an hour without being seen and stood up and left. I was not able to do a physical exam. He appeared to be in stable condition when he walked out of the room while I was trying to speak to him and perform a physical exam. Eloped at 12:19pm.    Amount and/or Complexity of Data Reviewed  Radiology: ordered.                                      Clinical Impression:  Final diagnoses:  [R05.9] Cough, unspecified type (Primary)          ED Disposition Condition    Eloped                 Estefany Malik PA-C  10/20/24 7254

## 2025-02-20 ENCOUNTER — OFFICE VISIT (OUTPATIENT)
Dept: UROLOGY | Facility: CLINIC | Age: 65
End: 2025-02-20
Payer: MEDICAID

## 2025-02-20 ENCOUNTER — LAB VISIT (OUTPATIENT)
Dept: LAB | Facility: HOSPITAL | Age: 65
End: 2025-02-20
Attending: UROLOGY
Payer: MEDICAID

## 2025-02-20 DIAGNOSIS — N13.8 BPH WITH OBSTRUCTION/LOWER URINARY TRACT SYMPTOMS: Primary | ICD-10-CM

## 2025-02-20 DIAGNOSIS — N40.1 BPH WITH OBSTRUCTION/LOWER URINARY TRACT SYMPTOMS: Primary | ICD-10-CM

## 2025-02-20 DIAGNOSIS — N13.8 BPH WITH OBSTRUCTION/LOWER URINARY TRACT SYMPTOMS: ICD-10-CM

## 2025-02-20 DIAGNOSIS — N40.1 BPH WITH OBSTRUCTION/LOWER URINARY TRACT SYMPTOMS: ICD-10-CM

## 2025-02-20 DIAGNOSIS — Z12.5 PROSTATE CANCER SCREENING: ICD-10-CM

## 2025-02-20 LAB
BILIRUB SERPL-MCNC: NORMAL MG/DL
BLOOD URINE, POC: NORMAL
COLOR, POC UA: YELLOW
GLUCOSE UR QL STRIP: NORMAL
KETONES UR QL STRIP: 15
LEUKOCYTE ESTERASE URINE, POC: NORMAL
NITRITE, POC UA: NORMAL
PH, POC UA: 5.5
PROTEIN, POC: NORMAL
PSA SERPL-MCNC: 0.51 NG/ML
SPECIFIC GRAVITY, POC UA: >=1.03
UROBILINOGEN, POC UA: 0.2

## 2025-02-20 PROCEDURE — 81001 URINALYSIS AUTO W/SCOPE: CPT | Mod: PBBFAC | Performed by: UROLOGY

## 2025-02-20 PROCEDURE — 84153 ASSAY OF PSA TOTAL: CPT

## 2025-02-20 PROCEDURE — 36415 COLL VENOUS BLD VENIPUNCTURE: CPT

## 2025-02-20 PROCEDURE — 99212 OFFICE O/P EST SF 10 MIN: CPT | Mod: PBBFAC | Performed by: UROLOGY

## 2025-02-20 RX ORDER — GABAPENTIN 100 MG/1
100 CAPSULE ORAL 3 TIMES DAILY
COMMUNITY

## 2025-02-20 NOTE — PROGRESS NOTES
CC:  Follow-up    HPI:  Anton Avelar is a 64 y.o. male seen for follow-up.  He was on tamsulosin for BPH.  He has no urinary complaints.      Urinalysis:  Results for orders placed or performed in visit on 02/20/25   POCT URINE DIPSTICK WITH MICROSCOPE, AUTOMATED   Result Value Ref Range    Color, UA Yellow     Spec Grav UA >=1.030     pH, UA 5.5     WBC, UA neg     Nitrite, UA neg     Protein, POC neg     Glucose, UA neg     Ketones, UA 15     Urobilinogen, UA 0.2     Bilirubin, POC small     Blood, UA neg      Microscopic Urinalysis:  WBC:   None per HPF     RBC:    None per HPF     Bacteria:    None per HPF     Squamous epithelial cells:  None per HPF      Crystals:   None    Lab Results:  PSA History:     Latest Reference Range & Units 07/09/18 09:51 01/31/24 12:20   Prostate Specific Antigen <=4.00 ng/mL 0.2 0.38       ROS:  All systems reviewed and are negative except as documented in HPI and/or Assessment and Plan.     Patient Active Problem List:     Problem List[1]     Past Medical History:  Past Medical History:   Diagnosis Date    High cholesterol     Hypertension         Past Surgical History:  Past Surgical History:   Procedure Laterality Date    BIOPSY OF NASOPHARYNX  3/19/2024    Procedure: BIOPSY, NASOPHARYNX;  Surgeon: Felix Smith MD;  Location: [facility];  Service: ENT;;    MYRINGOTOMY WITH INSERTION OF VENTILATION TUBE Bilateral 3/19/2024    Procedure: MYRINGOTOMY, WITH TYMPANOSTOMY TUBE INSERTION;  Surgeon: Felix Smith MD;  Location: [facility];  Service: ENT;  Laterality: Bilateral;    SPERMATOCELECTOMY Right 3/19/2024    Procedure: EXCISION, SPERMATOCELE;  Surgeon: Allyson Renteria DO;  Location: [facility];  Service: Urology;  Laterality: Right;  p.e. tubes,5mm 0deg, long graspers(nasal endoscopy)  no frozens        Family History:  History reviewed. No pertinent family history.     Social History:  Social History     Socioeconomic History    Marital status: Single   Tobacco Use     Smoking status: Every Day     Current packs/day: 0.50     Average packs/day: 0.5 packs/day for 49.1 years (24.6 ttl pk-yrs)     Types: Cigarettes     Start date: 1976     Passive exposure: Current    Smokeless tobacco: Never    Tobacco comments:     Slowed down on smoking 6 cigarettes a day   Substance and Sexual Activity    Alcohol use: Yes     Alcohol/week: 6.0 standard drinks of alcohol     Types: 6 Cans of beer per week     Comment: occasionally    Sexual activity: Yes     Partners: Male        Allergies:  Review of patient's allergies indicates:  No Known Allergies     Objective:  There were no vitals filed for this visit.  General:  Well developed, well nourished adult male in no acute distress  Abdomen: Soft, nontender, no masses  Extremities:  No clubbing, cyanosis, or edema  Neurologic:  Grossly intact  Musculoskeletal:  Normal tone    Assessment:  1. BPH with obstruction/lower urinary tract symptoms  - POCT URINE DIPSTICK WITH MICROSCOPE, AUTOMATED  - PSA, Total (Diagnostic); Future  - PSA, Total (Diagnostic); Future    2. Prostate cancer screening    Plan:  Continue tamsulosin.   PSA today.      Follow-up tests needed:   PSA in one year.      Return appointment:  One year with above lab.                    [1]   Patient Active Problem List  Diagnosis    Anxiety    Depression    Back pain    Benign prostatic hyperplasia    Ectropion of left eye    Hypertension    Neck pain    Tobacco user    Epididymal cyst    Chronic otitis media of both ears with effusion    Mixed hearing loss, bilateral    Adenoid hypertrophy

## (undated) DEVICE — GLOVE SENSICARE PI GRN 6.5

## (undated) DEVICE — GLOVE SIGNATURE ESSNTL LTX 6.5

## (undated) DEVICE — CONTAINER SPECIMEN OR STER 4OZ

## (undated) DEVICE — SPONGE PATTY SURGICAL .5X3IN

## (undated) DEVICE — KIT SURGICAL TURNOVER

## (undated) DEVICE — TRAY SKIN SCRUB WET PREMIUM

## (undated) DEVICE — COVER TABLE HVY DTY 60X90IN

## (undated) DEVICE — HEMOSTAT SURGICEL PWD 3G

## (undated) DEVICE — GLOVE PROTEXIS HYDROGEL SZ7.5

## (undated) DEVICE — PAD CURAD NONADH 3X4IN

## (undated) DEVICE — TUBING MEDI-VAC 20FT .25IN

## (undated) DEVICE — SUT CHROMIC 3-0 SH 27IN GUT

## (undated) DEVICE — COTTONBALL LARGE STRL

## (undated) DEVICE — KIT ANTIFOG W/SPONG & FLUID

## (undated) DEVICE — GLOVE SENSICARE PI GRN 6

## (undated) DEVICE — MANIFOLD 4 PORT

## (undated) DEVICE — DRAPE FULL SHEET 70X100IN

## (undated) DEVICE — CORD CAUTERY BIPOLAR STERILE

## (undated) DEVICE — PROTECTOR ONE-STEP ARM REG

## (undated) DEVICE — GLOVE SIGNATURE MICRO LTX 6

## (undated) DEVICE — GLOVE SIGNATURE MICRO LTX 6.5

## (undated) DEVICE — GLOVE SENSICARE NEOPRENE 6.5

## (undated) DEVICE — SPONGE LAP 18X18 PREWASHED

## (undated) DEVICE — TOWEL OR DISP STRL BLUE 4/PK

## (undated) DEVICE — GOWN POLY REINF BRTH SLV XL

## (undated) DEVICE — Device

## (undated) DEVICE — COVER MAYO STND XL 30X57IN

## (undated) DEVICE — SOL NACL IRR 1000ML BTL

## (undated) DEVICE — YANKAUER FLEX NO VENT REG CAP

## (undated) DEVICE — SPONGE GAUZE 16PLY 4X4

## (undated) DEVICE — SUT SA85H SILK 2-0